# Patient Record
Sex: FEMALE | Race: WHITE | NOT HISPANIC OR LATINO | Employment: FULL TIME | URBAN - NONMETROPOLITAN AREA
[De-identification: names, ages, dates, MRNs, and addresses within clinical notes are randomized per-mention and may not be internally consistent; named-entity substitution may affect disease eponyms.]

---

## 2016-09-19 LAB
EXTERNAL ABO GROUPING: NORMAL
EXTERNAL ANTIBODY SCREEN: NEGATIVE
EXTERNAL CHLAMYDIA SCREEN: NORMAL
EXTERNAL GONORRHEA SCREEN: NORMAL
EXTERNAL GROUP B STREP ANTIGEN: NEGATIVE
EXTERNAL HERPES CULTURE: NORMAL
EXTERNAL RH FACTOR: POSITIVE
EXTERNAL RUBELLA QUALITATIVE: NORMAL
EXTERNAL SYPHILIS RPR SCREEN: NORMAL
HIV1 AB SPEC QL IA.RAPID: NEGATIVE

## 2017-05-16 ENCOUNTER — ANESTHESIA (OUTPATIENT)
Dept: LABOR AND DELIVERY | Facility: HOSPITAL | Age: 32
End: 2017-05-16

## 2017-05-16 ENCOUNTER — HOSPITAL ENCOUNTER (INPATIENT)
Facility: HOSPITAL | Age: 32
LOS: 2 days | Discharge: HOME | End: 2017-05-18
Attending: OBSTETRICS & GYNECOLOGY | Admitting: OBSTETRICS & GYNECOLOGY

## 2017-05-16 ENCOUNTER — ANESTHESIA EVENT (OUTPATIENT)
Dept: LABOR AND DELIVERY | Facility: HOSPITAL | Age: 32
End: 2017-05-16

## 2017-05-16 DIAGNOSIS — Z37.9 NORMAL LABOR: ICD-10-CM

## 2017-05-16 LAB
A1 MICROGLOB PLACENTAL VAG QL: POSITIVE
ABO GROUP BLD: NORMAL
BLD GP AB SCN SERPL QL: NEGATIVE
DEPRECATED RDW RBC AUTO: 45.4 FL (ref 40–54)
ERYTHROCYTE [DISTWIDTH] IN BLOOD BY AUTOMATED COUNT: 14.4 % (ref 12–15)
HCT VFR BLD AUTO: 35.1 % (ref 37–47)
HGB BLD-MCNC: 12 G/DL (ref 12–16)
LYMPHOCYTES # BLD MANUAL: 1.15 10*3/MM3 (ref 0.72–4.86)
LYMPHOCYTES NFR BLD MANUAL: 12 % (ref 15–45)
LYMPHOCYTES NFR BLD MANUAL: 5 % (ref 4–12)
MCH RBC QN AUTO: 29.8 PG (ref 28–32)
MCHC RBC AUTO-ENTMCNC: 34.2 G/DL (ref 33–36)
MCV RBC AUTO: 87.1 FL (ref 82–98)
MONOCYTES # BLD AUTO: 0.48 10*3/MM3 (ref 0.19–1.3)
NEUTROPHILS # BLD AUTO: 7.93 10*3/MM3 (ref 1.87–8.4)
NEUTROPHILS NFR BLD MANUAL: 83 % (ref 39–78)
PLAT MORPH BLD: NORMAL
PLATELET # BLD AUTO: 187 10*3/MM3 (ref 130–400)
PMV BLD AUTO: 10.9 FL (ref 6–12)
RBC # BLD AUTO: 4.03 10*6/MM3 (ref 4.2–5.4)
RBC MORPH BLD: NORMAL
RH BLD: POSITIVE
WBC MORPH BLD: NORMAL
WBC NRBC COR # BLD: 9.56 10*3/MM3 (ref 4.8–10.8)

## 2017-05-16 PROCEDURE — 84112 EVAL AMNIOTIC FLUID PROTEIN: CPT | Performed by: OBSTETRICS & GYNECOLOGY

## 2017-05-16 PROCEDURE — C1755 CATHETER, INTRASPINAL: HCPCS | Performed by: NURSE ANESTHETIST, CERTIFIED REGISTERED

## 2017-05-16 PROCEDURE — 86850 RBC ANTIBODY SCREEN: CPT | Performed by: OBSTETRICS & GYNECOLOGY

## 2017-05-16 PROCEDURE — 85027 COMPLETE CBC AUTOMATED: CPT | Performed by: OBSTETRICS & GYNECOLOGY

## 2017-05-16 PROCEDURE — 51703 INSERT BLADDER CATH COMPLEX: CPT

## 2017-05-16 PROCEDURE — 86900 BLOOD TYPING SEROLOGIC ABO: CPT | Performed by: OBSTETRICS & GYNECOLOGY

## 2017-05-16 PROCEDURE — 88307 TISSUE EXAM BY PATHOLOGIST: CPT | Performed by: OBSTETRICS & GYNECOLOGY

## 2017-05-16 PROCEDURE — 0KQM0ZZ REPAIR PERINEUM MUSCLE, OPEN APPROACH: ICD-10-PCS | Performed by: OBSTETRICS & GYNECOLOGY

## 2017-05-16 PROCEDURE — 25010000002 ROPIVACAINE PER 1 MG: Performed by: NURSE ANESTHETIST, CERTIFIED REGISTERED

## 2017-05-16 PROCEDURE — 85007 BL SMEAR W/DIFF WBC COUNT: CPT | Performed by: OBSTETRICS & GYNECOLOGY

## 2017-05-16 PROCEDURE — 86901 BLOOD TYPING SEROLOGIC RH(D): CPT | Performed by: OBSTETRICS & GYNECOLOGY

## 2017-05-16 RX ORDER — PROMETHAZINE HYDROCHLORIDE 25 MG/1
12.5 TABLET ORAL EVERY 6 HOURS PRN
Status: DISCONTINUED | OUTPATIENT
Start: 2017-05-16 | End: 2017-05-16

## 2017-05-16 RX ORDER — MORPHINE SULFATE 2 MG/ML
2 INJECTION, SOLUTION INTRAMUSCULAR; INTRAVENOUS
Status: DISCONTINUED | OUTPATIENT
Start: 2017-05-16 | End: 2017-05-16

## 2017-05-16 RX ORDER — ACETAMINOPHEN 325 MG/1
650 TABLET ORAL EVERY 4 HOURS PRN
Status: DISCONTINUED | OUTPATIENT
Start: 2017-05-16 | End: 2017-05-16

## 2017-05-16 RX ORDER — ONDANSETRON 4 MG/1
4 TABLET, FILM COATED ORAL EVERY 6 HOURS PRN
Status: DISCONTINUED | OUTPATIENT
Start: 2017-05-16 | End: 2017-05-16 | Stop reason: HOSPADM

## 2017-05-16 RX ORDER — PROMETHAZINE HYDROCHLORIDE 25 MG/ML
12.5 INJECTION, SOLUTION INTRAMUSCULAR; INTRAVENOUS EVERY 6 HOURS PRN
Status: DISCONTINUED | OUTPATIENT
Start: 2017-05-16 | End: 2017-05-16 | Stop reason: CLARIF

## 2017-05-16 RX ORDER — ONDANSETRON 4 MG/1
4 TABLET, FILM COATED ORAL EVERY 6 HOURS PRN
Status: DISCONTINUED | OUTPATIENT
Start: 2017-05-16 | End: 2017-05-16 | Stop reason: SDUPTHER

## 2017-05-16 RX ORDER — METHYLERGONOVINE MALEATE 0.2 MG/ML
200 INJECTION INTRAVENOUS AS NEEDED
Status: DISCONTINUED | OUTPATIENT
Start: 2017-05-16 | End: 2017-05-16 | Stop reason: HOSPADM

## 2017-05-16 RX ORDER — DOCUSATE SODIUM 100 MG/1
100 CAPSULE, LIQUID FILLED ORAL 2 TIMES DAILY
Status: DISCONTINUED | OUTPATIENT
Start: 2017-05-16 | End: 2017-05-18 | Stop reason: HOSPADM

## 2017-05-16 RX ORDER — CARBOPROST TROMETHAMINE 250 UG/ML
250 INJECTION, SOLUTION INTRAMUSCULAR ONCE AS NEEDED
Status: DISCONTINUED | OUTPATIENT
Start: 2017-05-16 | End: 2017-05-18 | Stop reason: HOSPADM

## 2017-05-16 RX ORDER — BISACODYL 10 MG
10 SUPPOSITORY, RECTAL RECTAL DAILY PRN
Status: DISCONTINUED | OUTPATIENT
Start: 2017-05-17 | End: 2017-05-18 | Stop reason: HOSPADM

## 2017-05-16 RX ORDER — TERBUTALINE SULFATE 1 MG/ML
0.25 INJECTION, SOLUTION SUBCUTANEOUS AS NEEDED
Status: DISCONTINUED | OUTPATIENT
Start: 2017-05-16 | End: 2017-05-16

## 2017-05-16 RX ORDER — OXYTOCIN/RINGER'S LACTATE 20/1000 ML
999 PLASTIC BAG, INJECTION (ML) INTRAVENOUS CONTINUOUS PRN
Status: DISCONTINUED | OUTPATIENT
Start: 2017-05-16 | End: 2017-05-16 | Stop reason: SDUPTHER

## 2017-05-16 RX ORDER — ONDANSETRON 4 MG/1
4 TABLET, ORALLY DISINTEGRATING ORAL EVERY 6 HOURS PRN
Status: DISCONTINUED | OUTPATIENT
Start: 2017-05-16 | End: 2017-05-16 | Stop reason: SDUPTHER

## 2017-05-16 RX ORDER — CETIRIZINE HYDROCHLORIDE 10 MG/1
10 TABLET ORAL DAILY
Status: ON HOLD | COMMUNITY
End: 2019-10-12

## 2017-05-16 RX ORDER — LIDOCAINE HYDROCHLORIDE 10 MG/ML
5 INJECTION, SOLUTION INFILTRATION; PERINEURAL AS NEEDED
Status: DISCONTINUED | OUTPATIENT
Start: 2017-05-16 | End: 2017-05-16

## 2017-05-16 RX ORDER — PROMETHAZINE HYDROCHLORIDE 12.5 MG/1
12.5 SUPPOSITORY RECTAL EVERY 6 HOURS PRN
Status: DISCONTINUED | OUTPATIENT
Start: 2017-05-16 | End: 2017-05-18 | Stop reason: HOSPADM

## 2017-05-16 RX ORDER — SODIUM CHLORIDE 0.9 % (FLUSH) 0.9 %
1-10 SYRINGE (ML) INJECTION AS NEEDED
Status: DISCONTINUED | OUTPATIENT
Start: 2017-05-16 | End: 2017-05-16

## 2017-05-16 RX ORDER — PROMETHAZINE HYDROCHLORIDE 25 MG/1
12.5 TABLET ORAL EVERY 6 HOURS PRN
Status: DISCONTINUED | OUTPATIENT
Start: 2017-05-16 | End: 2017-05-16 | Stop reason: SDUPTHER

## 2017-05-16 RX ORDER — PROMETHAZINE HYDROCHLORIDE 25 MG/ML
12.5 INJECTION, SOLUTION INTRAMUSCULAR; INTRAVENOUS EVERY 6 HOURS PRN
Status: DISCONTINUED | OUTPATIENT
Start: 2017-05-16 | End: 2017-05-16 | Stop reason: ALTCHOICE

## 2017-05-16 RX ORDER — MISOPROSTOL 200 UG/1
800 TABLET ORAL AS NEEDED
Status: DISCONTINUED | OUTPATIENT
Start: 2017-05-16 | End: 2017-05-16 | Stop reason: HOSPADM

## 2017-05-16 RX ORDER — MISOPROSTOL 200 UG/1
600 TABLET ORAL ONCE AS NEEDED
Status: DISCONTINUED | OUTPATIENT
Start: 2017-05-16 | End: 2017-05-18 | Stop reason: HOSPADM

## 2017-05-16 RX ORDER — OXYCODONE HYDROCHLORIDE AND ACETAMINOPHEN 5; 325 MG/1; MG/1
1 TABLET ORAL EVERY 6 HOURS PRN
Status: DISCONTINUED | OUTPATIENT
Start: 2017-05-16 | End: 2017-05-18 | Stop reason: HOSPADM

## 2017-05-16 RX ORDER — LIDOCAINE HYDROCHLORIDE AND EPINEPHRINE 15; 5 MG/ML; UG/ML
INJECTION, SOLUTION EPIDURAL AS NEEDED
Status: DISCONTINUED | OUTPATIENT
Start: 2017-05-16 | End: 2017-05-18 | Stop reason: SURG

## 2017-05-16 RX ORDER — ONDANSETRON 2 MG/ML
4 INJECTION INTRAMUSCULAR; INTRAVENOUS EVERY 6 HOURS PRN
Status: DISCONTINUED | OUTPATIENT
Start: 2017-05-16 | End: 2017-05-16 | Stop reason: SDUPTHER

## 2017-05-16 RX ORDER — IBUPROFEN 800 MG/1
800 TABLET ORAL EVERY 8 HOURS PRN
Status: DISCONTINUED | OUTPATIENT
Start: 2017-05-16 | End: 2017-05-18 | Stop reason: HOSPADM

## 2017-05-16 RX ORDER — PROMETHAZINE HYDROCHLORIDE 25 MG/1
25 TABLET ORAL EVERY 6 HOURS PRN
Status: DISCONTINUED | OUTPATIENT
Start: 2017-05-16 | End: 2017-05-18 | Stop reason: HOSPADM

## 2017-05-16 RX ORDER — CARBOPROST TROMETHAMINE 250 UG/ML
250 INJECTION, SOLUTION INTRAMUSCULAR AS NEEDED
Status: DISCONTINUED | OUTPATIENT
Start: 2017-05-16 | End: 2017-05-16 | Stop reason: HOSPADM

## 2017-05-16 RX ORDER — OXYTOCIN/RINGER'S LACTATE 20/1000 ML
999 PLASTIC BAG, INJECTION (ML) INTRAVENOUS ONCE
Status: DISCONTINUED | OUTPATIENT
Start: 2017-05-16 | End: 2017-05-16 | Stop reason: HOSPADM

## 2017-05-16 RX ORDER — ONDANSETRON 4 MG/1
4 TABLET, ORALLY DISINTEGRATING ORAL EVERY 6 HOURS PRN
Status: DISCONTINUED | OUTPATIENT
Start: 2017-05-16 | End: 2017-05-18 | Stop reason: HOSPADM

## 2017-05-16 RX ORDER — SODIUM CHLORIDE, SODIUM LACTATE, POTASSIUM CHLORIDE, CALCIUM CHLORIDE 600; 310; 30; 20 MG/100ML; MG/100ML; MG/100ML; MG/100ML
125 INJECTION, SOLUTION INTRAVENOUS CONTINUOUS
Status: DISCONTINUED | OUTPATIENT
Start: 2017-05-16 | End: 2017-05-16

## 2017-05-16 RX ORDER — OXYTOCIN/0.9 % SODIUM CHLORIDE 30/500 ML
2-30 PLASTIC BAG, INJECTION (ML) INTRAVENOUS
Status: DISCONTINUED | OUTPATIENT
Start: 2017-05-16 | End: 2017-05-16

## 2017-05-16 RX ORDER — METHYLERGONOVINE MALEATE 0.2 MG/ML
200 INJECTION INTRAVENOUS ONCE AS NEEDED
Status: DISCONTINUED | OUTPATIENT
Start: 2017-05-16 | End: 2017-05-18 | Stop reason: HOSPADM

## 2017-05-16 RX ORDER — PROMETHAZINE HYDROCHLORIDE 12.5 MG/1
12.5 SUPPOSITORY RECTAL EVERY 6 HOURS PRN
Status: DISCONTINUED | OUTPATIENT
Start: 2017-05-16 | End: 2017-05-16 | Stop reason: HOSPADM

## 2017-05-16 RX ORDER — ONDANSETRON 2 MG/ML
4 INJECTION INTRAMUSCULAR; INTRAVENOUS EVERY 6 HOURS PRN
Status: DISCONTINUED | OUTPATIENT
Start: 2017-05-16 | End: 2017-05-18 | Stop reason: HOSPADM

## 2017-05-16 RX ORDER — PROMETHAZINE HYDROCHLORIDE 12.5 MG/1
12.5 SUPPOSITORY RECTAL EVERY 6 HOURS PRN
Status: DISCONTINUED | OUTPATIENT
Start: 2017-05-16 | End: 2017-05-16 | Stop reason: SDUPTHER

## 2017-05-16 RX ORDER — ONDANSETRON 4 MG/1
4 TABLET, ORALLY DISINTEGRATING ORAL EVERY 6 HOURS PRN
Status: DISCONTINUED | OUTPATIENT
Start: 2017-05-16 | End: 2017-05-16 | Stop reason: HOSPADM

## 2017-05-16 RX ORDER — ONDANSETRON 2 MG/ML
4 INJECTION INTRAMUSCULAR; INTRAVENOUS EVERY 6 HOURS PRN
Status: DISCONTINUED | OUTPATIENT
Start: 2017-05-16 | End: 2017-05-16 | Stop reason: HOSPADM

## 2017-05-16 RX ORDER — IBUPROFEN 800 MG/1
800 TABLET ORAL EVERY 8 HOURS PRN
Status: DISCONTINUED | OUTPATIENT
Start: 2017-05-16 | End: 2017-05-16 | Stop reason: SDUPTHER

## 2017-05-16 RX ORDER — PRENATAL VIT/IRON FUM/FOLIC AC 27MG-0.8MG
1 TABLET ORAL DAILY
Status: DISCONTINUED | OUTPATIENT
Start: 2017-05-16 | End: 2017-05-18 | Stop reason: HOSPADM

## 2017-05-16 RX ORDER — ONDANSETRON 4 MG/1
4 TABLET, FILM COATED ORAL EVERY 6 HOURS PRN
Status: DISCONTINUED | OUTPATIENT
Start: 2017-05-16 | End: 2017-05-18 | Stop reason: HOSPADM

## 2017-05-16 RX ORDER — LIDOCAINE HYDROCHLORIDE 20 MG/ML
INJECTION, SOLUTION EPIDURAL; INFILTRATION; INTRACAUDAL; PERINEURAL AS NEEDED
Status: DISCONTINUED | OUTPATIENT
Start: 2017-05-16 | End: 2017-05-18 | Stop reason: SURG

## 2017-05-16 RX ORDER — LIDOCAINE HYDROCHLORIDE 10 MG/ML
INJECTION, SOLUTION EPIDURAL; INFILTRATION; INTRACAUDAL; PERINEURAL AS NEEDED
Status: DISCONTINUED | OUTPATIENT
Start: 2017-05-16 | End: 2017-05-18 | Stop reason: SURG

## 2017-05-16 RX ORDER — OXYTOCIN/RINGER'S LACTATE 20/1000 ML
125 PLASTIC BAG, INJECTION (ML) INTRAVENOUS AS NEEDED
Status: DISCONTINUED | OUTPATIENT
Start: 2017-05-16 | End: 2017-05-16 | Stop reason: HOSPADM

## 2017-05-16 RX ORDER — PRENATAL VIT NO.126/IRON/FOLIC 28MG-0.8MG
1 TABLET ORAL DAILY
Status: ON HOLD | COMMUNITY
End: 2019-10-12

## 2017-05-16 RX ORDER — SODIUM CHLORIDE 0.9 % (FLUSH) 0.9 %
1-10 SYRINGE (ML) INJECTION AS NEEDED
Status: DISCONTINUED | OUTPATIENT
Start: 2017-05-16 | End: 2017-05-18 | Stop reason: HOSPADM

## 2017-05-16 RX ORDER — BUTORPHANOL TARTRATE 1 MG/ML
1 INJECTION, SOLUTION INTRAMUSCULAR; INTRAVENOUS
Status: DISCONTINUED | OUTPATIENT
Start: 2017-05-16 | End: 2017-05-16

## 2017-05-16 RX ADMIN — DOCUSATE SODIUM 100 MG: 100 CAPSULE, LIQUID FILLED ORAL at 21:00

## 2017-05-16 RX ADMIN — SODIUM CHLORIDE, POTASSIUM CHLORIDE, SODIUM LACTATE AND CALCIUM CHLORIDE 1000 ML: 600; 310; 30; 20 INJECTION, SOLUTION INTRAVENOUS at 13:30

## 2017-05-16 RX ADMIN — LIDOCAINE HYDROCHLORIDE 5 ML: 20 INJECTION, SOLUTION EPIDURAL; INFILTRATION; INTRACAUDAL; PERINEURAL at 14:07

## 2017-05-16 RX ADMIN — SODIUM CHLORIDE, POTASSIUM CHLORIDE, SODIUM LACTATE AND CALCIUM CHLORIDE 125 ML/HR: 600; 310; 30; 20 INJECTION, SOLUTION INTRAVENOUS at 10:09

## 2017-05-16 RX ADMIN — LIDOCAINE HYDROCHLORIDE 5 ML: 20 INJECTION, SOLUTION EPIDURAL; INFILTRATION; INTRACAUDAL; PERINEURAL at 14:03

## 2017-05-16 RX ADMIN — LIDOCAINE HYDROCHLORIDE 3 ML: 10 INJECTION, SOLUTION EPIDURAL; INFILTRATION; INTRACAUDAL; PERINEURAL at 13:39

## 2017-05-16 RX ADMIN — EPHEDRINE SULFATE 10 MG: 50 INJECTION INTRAMUSCULAR; INTRAVENOUS; SUBCUTANEOUS at 14:36

## 2017-05-16 RX ADMIN — LIDOCAINE HYDROCHLORIDE 3 ML: 10 INJECTION, SOLUTION EPIDURAL; INFILTRATION; INTRACAUDAL; PERINEURAL at 13:50

## 2017-05-16 RX ADMIN — OXYTOCIN-SODIUM CHLORIDE 0.9% IV SOLN 30 UNIT/500ML 2 MILLI-UNITS/MIN: 30-0.9/5 SOLUTION at 10:28

## 2017-05-16 RX ADMIN — ROPIVACAINE HYDROCHLORIDE 10 ML/HR: 2 INJECTION, SOLUTION EPIDURAL; INFILTRATION at 14:09

## 2017-05-16 RX ADMIN — LIDOCAINE HYDROCHLORIDE,EPINEPHRINE BITARTRATE 3 ML: 15; .005 INJECTION, SOLUTION EPIDURAL; INFILTRATION; INTRACAUDAL; PERINEURAL at 13:42

## 2017-05-16 RX ADMIN — LIDOCAINE HYDROCHLORIDE,EPINEPHRINE BITARTRATE 3 ML: 15; .005 INJECTION, SOLUTION EPIDURAL; INFILTRATION; INTRACAUDAL; PERINEURAL at 13:58

## 2017-05-16 RX ADMIN — PRENATAL VIT W/ FE FUMARATE-FA TAB 27-0.8 MG 1 TABLET: 27-0.8 TAB at 21:00

## 2017-05-17 LAB
HCT VFR BLD AUTO: 32.9 % (ref 37–47)
HGB BLD-MCNC: 11 G/DL (ref 12–16)

## 2017-05-17 PROCEDURE — 85014 HEMATOCRIT: CPT | Performed by: OBSTETRICS & GYNECOLOGY

## 2017-05-17 PROCEDURE — 85018 HEMOGLOBIN: CPT | Performed by: OBSTETRICS & GYNECOLOGY

## 2017-05-17 RX ADMIN — DOCUSATE SODIUM 100 MG: 100 CAPSULE, LIQUID FILLED ORAL at 09:14

## 2017-05-17 RX ADMIN — DOCUSATE SODIUM 100 MG: 100 CAPSULE, LIQUID FILLED ORAL at 19:11

## 2017-05-17 RX ADMIN — IBUPROFEN 800 MG: 800 TABLET, FILM COATED ORAL at 19:48

## 2017-05-17 RX ADMIN — IBUPROFEN 800 MG: 800 TABLET, FILM COATED ORAL at 03:43

## 2017-05-17 RX ADMIN — PRENATAL VIT W/ FE FUMARATE-FA TAB 27-0.8 MG 1 TABLET: 27-0.8 TAB at 19:11

## 2017-05-18 VITALS
SYSTOLIC BLOOD PRESSURE: 123 MMHG | DIASTOLIC BLOOD PRESSURE: 72 MMHG | RESPIRATION RATE: 18 BRPM | HEART RATE: 87 BPM | BODY MASS INDEX: 42.52 KG/M2 | HEIGHT: 63 IN | WEIGHT: 240 LBS | TEMPERATURE: 97.8 F | OXYGEN SATURATION: 100 %

## 2017-05-18 RX ORDER — OXYCODONE HYDROCHLORIDE AND ACETAMINOPHEN 5; 325 MG/1; MG/1
1 TABLET ORAL EVERY 6 HOURS PRN
Qty: 10 TABLET | Refills: 0 | Status: SHIPPED | OUTPATIENT
Start: 2017-05-18 | End: 2017-05-26

## 2017-05-18 RX ORDER — IBUPROFEN 800 MG/1
800 TABLET ORAL EVERY 8 HOURS PRN
Qty: 90 TABLET | Refills: 2 | Status: SHIPPED | OUTPATIENT
Start: 2017-05-18

## 2017-05-18 RX ADMIN — DOCUSATE SODIUM 100 MG: 100 CAPSULE, LIQUID FILLED ORAL at 08:55

## 2017-05-19 ENCOUNTER — TELEPHONE (OUTPATIENT)
Dept: LACTATION | Facility: HOSPITAL | Age: 32
End: 2017-05-19

## 2017-05-19 LAB
CYTO UR: NORMAL
LAB AP CASE REPORT: NORMAL
LAB AP CLINICAL INFORMATION: NORMAL
Lab: NORMAL
PATH REPORT.FINAL DX SPEC: NORMAL
PATH REPORT.GROSS SPEC: NORMAL

## 2017-05-20 ENCOUNTER — HOSPITAL ENCOUNTER (OUTPATIENT)
Dept: LACTATION | Facility: HOSPITAL | Age: 32
Discharge: HOME | End: 2017-05-20

## 2017-05-22 ENCOUNTER — HOSPITAL ENCOUNTER (OUTPATIENT)
Dept: LACTATION | Facility: HOSPITAL | Age: 32
Discharge: HOME | End: 2017-05-22

## 2019-10-11 ENCOUNTER — APPOINTMENT (OUTPATIENT)
Dept: CT IMAGING | Facility: HOSPITAL | Age: 34
End: 2019-10-11

## 2019-10-11 ENCOUNTER — APPOINTMENT (OUTPATIENT)
Dept: GENERAL RADIOLOGY | Facility: HOSPITAL | Age: 34
End: 2019-10-11

## 2019-10-11 ENCOUNTER — HOSPITAL ENCOUNTER (OUTPATIENT)
Facility: HOSPITAL | Age: 34
Setting detail: OBSERVATION
Discharge: HOME | End: 2019-10-12
Attending: EMERGENCY MEDICINE | Admitting: INTERNAL MEDICINE
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
## 2019-10-11 DIAGNOSIS — J18.9 HOSPITAL-ACQUIRED PNEUMONIA: Primary | ICD-10-CM

## 2019-10-11 LAB
ALBUMIN SERPL-MCNC: 3.9 G/DL (ref 3.5–5.2)
ALBUMIN/GLOB SERPL: 0.9 G/DL
ALP SERPL-CCNC: 101 U/L (ref 39–117)
ALT SERPL W P-5'-P-CCNC: 14 U/L (ref 1–33)
ANION GAP SERPL CALCULATED.3IONS-SCNC: 14 MMOL/L (ref 5–15)
APTT PPP: 30.1 SECONDS (ref 24.1–35)
AST SERPL-CCNC: 18 U/L (ref 1–32)
ATMOSPHERIC PRESS: 754 MMHG
BASE EXCESS BLDV CALC-SCNC: 0.7 MMOL/L (ref 0–2)
BASOPHILS # BLD AUTO: 0.02 10*3/MM3 (ref 0–0.2)
BASOPHILS NFR BLD AUTO: 0.2 % (ref 0–1.5)
BDY SITE: ABNORMAL
BILIRUB SERPL-MCNC: 0.3 MG/DL (ref 0.2–1.2)
BODY TEMPERATURE: 37 C
BUN BLD-MCNC: 8 MG/DL (ref 6–20)
BUN/CREAT SERPL: 10.8 (ref 7–25)
CALCIUM SPEC-SCNC: 8.8 MG/DL (ref 8.6–10.5)
CHLORIDE SERPL-SCNC: 98 MMOL/L (ref 98–107)
CO2 SERPL-SCNC: 23 MMOL/L (ref 22–29)
CREAT BLD-MCNC: 0.74 MG/DL (ref 0.57–1)
D DIMER PPP FEU-MCNC: 2.08 MG/L (FEU) (ref 0–0.5)
DEPRECATED RDW RBC AUTO: 40.1 FL (ref 37–54)
EOSINOPHIL # BLD AUTO: 0.11 10*3/MM3 (ref 0–0.4)
EOSINOPHIL NFR BLD AUTO: 1.1 % (ref 0.3–6.2)
ERYTHROCYTE [DISTWIDTH] IN BLOOD BY AUTOMATED COUNT: 12.7 % (ref 12.3–15.4)
GFR SERPL CREATININE-BSD FRML MDRD: 90 ML/MIN/1.73
GLOBULIN UR ELPH-MCNC: 4.4 GM/DL
GLUCOSE BLD-MCNC: 98 MG/DL (ref 65–99)
HCG SERPL QL: NEGATIVE
HCO3 BLDV-SCNC: 25.7 MMOL/L (ref 22–28)
HCT VFR BLD AUTO: 41.8 % (ref 34–46.6)
HGB BLD-MCNC: 13.9 G/DL (ref 12–15.9)
HOLD SPECIMEN: NORMAL
IMM GRANULOCYTES # BLD AUTO: 0.03 10*3/MM3 (ref 0–0.05)
IMM GRANULOCYTES NFR BLD AUTO: 0.3 % (ref 0–0.5)
INR PPP: 0.95 (ref 0.91–1.09)
LYMPHOCYTES # BLD AUTO: 0.26 10*3/MM3 (ref 0.7–3.1)
LYMPHOCYTES NFR BLD AUTO: 2.6 % (ref 19.6–45.3)
Lab: ABNORMAL
MCH RBC QN AUTO: 29 PG (ref 26.6–33)
MCHC RBC AUTO-ENTMCNC: 33.3 G/DL (ref 31.5–35.7)
MCV RBC AUTO: 87.1 FL (ref 79–97)
MODALITY: ABNORMAL
MONOCYTES # BLD AUTO: 0.28 10*3/MM3 (ref 0.1–0.9)
MONOCYTES NFR BLD AUTO: 2.8 % (ref 5–12)
NEUTROPHILS # BLD AUTO: 9.46 10*3/MM3 (ref 1.7–7)
NEUTROPHILS NFR BLD AUTO: 93 % (ref 42.7–76)
NRBC BLD AUTO-RTO: 0 /100 WBC (ref 0–0.2)
PCO2 BLDV: 41.7 MM HG (ref 41–51)
PH BLDV: 7.4 PH UNITS (ref 7.32–7.42)
PLATELET # BLD AUTO: 251 10*3/MM3 (ref 140–450)
PMV BLD AUTO: 9.4 FL (ref 6–12)
PO2 BLDV: 16.2 MM HG (ref 27–53)
POTASSIUM BLD-SCNC: 4.1 MMOL/L (ref 3.5–5.2)
PROT SERPL-MCNC: 8.3 G/DL (ref 6–8.5)
PROTHROMBIN TIME: 13 SECONDS (ref 11.9–14.6)
RBC # BLD AUTO: 4.8 10*6/MM3 (ref 3.77–5.28)
SAO2 % BLDCOV: 23.2 % (ref 45–75)
SODIUM BLD-SCNC: 135 MMOL/L (ref 136–145)
TROPONIN T SERPL-MCNC: <0.01 NG/ML (ref 0–0.03)
VENTILATOR MODE: ABNORMAL
WBC NRBC COR # BLD: 10.16 10*3/MM3 (ref 3.4–10.8)
WHOLE BLOOD HOLD SPECIMEN: NORMAL
WHOLE BLOOD HOLD SPECIMEN: NORMAL

## 2019-10-11 PROCEDURE — 25010000002 VANCOMYCIN 1 G RECONSTITUTED SOLUTION 1 EACH VIAL: Performed by: EMERGENCY MEDICINE

## 2019-10-11 PROCEDURE — G0378 HOSPITAL OBSERVATION PER HR: HCPCS

## 2019-10-11 PROCEDURE — 87040 BLOOD CULTURE FOR BACTERIA: CPT | Performed by: EMERGENCY MEDICINE

## 2019-10-11 PROCEDURE — 82803 BLOOD GASES ANY COMBINATION: CPT

## 2019-10-11 PROCEDURE — 84484 ASSAY OF TROPONIN QUANT: CPT | Performed by: EMERGENCY MEDICINE

## 2019-10-11 PROCEDURE — 85730 THROMBOPLASTIN TIME PARTIAL: CPT | Performed by: EMERGENCY MEDICINE

## 2019-10-11 PROCEDURE — 71046 X-RAY EXAM CHEST 2 VIEWS: CPT

## 2019-10-11 PROCEDURE — 96368 THER/DIAG CONCURRENT INF: CPT

## 2019-10-11 PROCEDURE — 96361 HYDRATE IV INFUSION ADD-ON: CPT

## 2019-10-11 PROCEDURE — 71275 CT ANGIOGRAPHY CHEST: CPT

## 2019-10-11 PROCEDURE — 96365 THER/PROPH/DIAG IV INF INIT: CPT

## 2019-10-11 PROCEDURE — 84703 CHORIONIC GONADOTROPIN ASSAY: CPT | Performed by: EMERGENCY MEDICINE

## 2019-10-11 PROCEDURE — 0 IOPAMIDOL PER 1 ML: Performed by: EMERGENCY MEDICINE

## 2019-10-11 PROCEDURE — 85025 COMPLETE CBC W/AUTO DIFF WBC: CPT | Performed by: EMERGENCY MEDICINE

## 2019-10-11 PROCEDURE — 93010 ELECTROCARDIOGRAM REPORT: CPT | Performed by: INTERNAL MEDICINE

## 2019-10-11 PROCEDURE — 99284 EMERGENCY DEPT VISIT MOD MDM: CPT

## 2019-10-11 PROCEDURE — 85610 PROTHROMBIN TIME: CPT | Performed by: EMERGENCY MEDICINE

## 2019-10-11 PROCEDURE — 80053 COMPREHEN METABOLIC PANEL: CPT | Performed by: EMERGENCY MEDICINE

## 2019-10-11 PROCEDURE — 93005 ELECTROCARDIOGRAM TRACING: CPT | Performed by: EMERGENCY MEDICINE

## 2019-10-11 PROCEDURE — 85379 FIBRIN DEGRADATION QUANT: CPT | Performed by: EMERGENCY MEDICINE

## 2019-10-11 RX ORDER — GUAIFENESIN AND PSEUDOEPHEDRINE HCL 1200; 120 MG/1; MG/1
TABLET, EXTENDED RELEASE ORAL DAILY
COMMUNITY

## 2019-10-11 RX ORDER — SODIUM CHLORIDE 9 MG/ML
100 INJECTION, SOLUTION INTRAVENOUS CONTINUOUS
Status: DISCONTINUED | OUTPATIENT
Start: 2019-10-12 | End: 2019-10-12

## 2019-10-11 RX ORDER — LORATADINE 10 MG/1
10 TABLET ORAL DAILY
COMMUNITY

## 2019-10-11 RX ORDER — IBUPROFEN 800 MG/1
800 TABLET ORAL EVERY 8 HOURS PRN
Status: DISCONTINUED | OUTPATIENT
Start: 2019-10-11 | End: 2019-10-12 | Stop reason: HOSPADM

## 2019-10-11 RX ORDER — SODIUM CHLORIDE 0.9 % (FLUSH) 0.9 %
10 SYRINGE (ML) INJECTION EVERY 12 HOURS SCHEDULED
Status: DISCONTINUED | OUTPATIENT
Start: 2019-10-12 | End: 2019-10-12 | Stop reason: HOSPADM

## 2019-10-11 RX ORDER — SODIUM CHLORIDE 0.9 % (FLUSH) 0.9 %
10 SYRINGE (ML) INJECTION AS NEEDED
Status: DISCONTINUED | OUTPATIENT
Start: 2019-10-11 | End: 2019-10-12 | Stop reason: HOSPADM

## 2019-10-11 RX ORDER — ACETAMINOPHEN 325 MG/1
650 TABLET ORAL EVERY 4 HOURS PRN
Status: DISCONTINUED | OUTPATIENT
Start: 2019-10-11 | End: 2019-10-12 | Stop reason: HOSPADM

## 2019-10-11 RX ORDER — ACETAMINOPHEN 160 MG/5ML
650 SOLUTION ORAL EVERY 4 HOURS PRN
Status: DISCONTINUED | OUTPATIENT
Start: 2019-10-11 | End: 2019-10-12 | Stop reason: HOSPADM

## 2019-10-11 RX ORDER — ONDANSETRON 2 MG/ML
4 INJECTION INTRAMUSCULAR; INTRAVENOUS EVERY 6 HOURS PRN
Status: DISCONTINUED | OUTPATIENT
Start: 2019-10-11 | End: 2019-10-12 | Stop reason: HOSPADM

## 2019-10-11 RX ORDER — ACETAMINOPHEN 650 MG/1
650 SUPPOSITORY RECTAL EVERY 4 HOURS PRN
Status: DISCONTINUED | OUTPATIENT
Start: 2019-10-11 | End: 2019-10-12 | Stop reason: HOSPADM

## 2019-10-11 RX ORDER — GUAIFENESIN, PSEUDOEPHEDRINE HYDROCHLORIDE 600; 60 MG/1; MG/1
2 TABLET, EXTENDED RELEASE ORAL DAILY
Status: DISCONTINUED | OUTPATIENT
Start: 2019-10-12 | End: 2019-10-12 | Stop reason: HOSPADM

## 2019-10-11 RX ORDER — IPRATROPIUM BROMIDE AND ALBUTEROL SULFATE 2.5; .5 MG/3ML; MG/3ML
3 SOLUTION RESPIRATORY (INHALATION)
Status: DISCONTINUED | OUTPATIENT
Start: 2019-10-12 | End: 2019-10-12 | Stop reason: HOSPADM

## 2019-10-11 RX ORDER — CETIRIZINE HYDROCHLORIDE 10 MG/1
10 TABLET ORAL DAILY
Status: DISCONTINUED | OUTPATIENT
Start: 2019-10-12 | End: 2019-10-12 | Stop reason: HOSPADM

## 2019-10-11 RX ADMIN — VANCOMYCIN HYDROCHLORIDE 2000 MG: 1 INJECTION, POWDER, LYOPHILIZED, FOR SOLUTION INTRAVENOUS at 21:16

## 2019-10-11 RX ADMIN — SODIUM CHLORIDE 1572 ML: 9 INJECTION, SOLUTION INTRAVENOUS at 18:39

## 2019-10-11 RX ADMIN — SODIUM CHLORIDE 1000 ML: 9 INJECTION, SOLUTION INTRAVENOUS at 21:26

## 2019-10-11 RX ADMIN — IBUPROFEN 800 MG: 800 TABLET ORAL at 23:29

## 2019-10-11 RX ADMIN — IOPAMIDOL 89 ML: 755 INJECTION, SOLUTION INTRAVENOUS at 19:36

## 2019-10-11 RX ADMIN — SODIUM CHLORIDE 2 G: 900 INJECTION INTRAVENOUS at 21:22

## 2019-10-11 NOTE — ED PROVIDER NOTES
Subjective   This 33-year-old female patient arrives today because she started having headaches this morning and then her cough has come back well along with pain to the right side of her chest which is similar to when she had pneumonia which was diagnosed on 29 September.  Also complains of bilateral heel pain.  She states that she was admitted to [facility] on 29 September overnight and they did do a CTA because her d-dimer was elevated to rule out PE which was negative.  She apparently was sent home on Levaquin and she finished the Levaquin by 2 days ago.  She denies any nausea or vomiting.  She states her headache was 7/10 at its worst and is now 3/10.  She denies any neck stiffness.  She does not really feel short of breath.  Denies diarrhea or urinary symptoms.  She has been having right calf pain for couple of days.      Past medical history is negative   Past surgical history is negative   habits patient denies smoking, patient he drinks alcohol but has not had any since her illness.  Denies use of drugs.            Review of Systems   Constitutional: Positive for chills, fatigue and fever.   HENT: Positive for ear pain. Negative for sore throat and trouble swallowing.    Respiratory: Positive for cough and shortness of breath.    Cardiovascular: Positive for chest pain and palpitations.   Gastrointestinal: Negative for abdominal pain, diarrhea, nausea and vomiting.   Genitourinary: Negative.    Musculoskeletal: Negative.    Neurological: Positive for headaches.   Psychiatric/Behavioral: Negative.        No past medical history on file.    Allergies   Allergen Reactions   • Ceclor [Cefaclor] Swelling   • Tetanus Toxoids Hives   • Bactrim [Sulfamethoxazole-Trimethoprim] Rash       Past Surgical History:   Procedure Laterality Date   • CHOLECYSTECTOMY     • ORTHOPEDIC SURGERY Left 04/1999       No family history on file.    Social History     Socioeconomic History   • Marital status:       Spouse name: Not on file   • Number of children: Not on file   • Years of education: Not on file   • Highest education level: Not on file   Tobacco Use   • Smoking status: Never Smoker   Substance and Sexual Activity   • Alcohol use: No   • Drug use: No   • Sexual activity: Yes           Objective   Physical Exam   Constitutional: She is oriented to person, place, and time. She appears well-developed and well-nourished.   HENT:   Head: Normocephalic and atraumatic.   Eyes: EOM are normal. Pupils are equal, round, and reactive to light.   Neck: Normal range of motion. Neck supple.   Cardiovascular: Regular rhythm and normal heart sounds.   Patient has a very rapid but regular rhythm.  No murmurs are heard.   Abdominal: Soft. Bowel sounds are normal. There is no tenderness.   Musculoskeletal: Normal range of motion.   Neurological: She is alert and oriented to person, place, and time.   Skin: Skin is warm and dry.   Nursing note and vitals reviewed.      Procedures           ED Course                  MDM  Number of Diagnoses or Management Options  Diagnosis management comments: 1900 the patient's chest x-ray was read as negative for pneumonia by the radiologist.  Ddimer is showing elevated so I am suspecting that she may well have a PE this time.  CTA is ordered.  2030 CT has now been read by the radiologist and it looks as if it showing an pneumonia still.  Evidence of a pulmonary embolism.  Cultures have been collected and the patient is being started on antibiotics for hospital-acquired pneumonia.  She continues to be tachycardic.  Will readmit to the hospital.  2140  Case discussed with Dr Pratt who feels she can be admitted to observation.  Will do.        Final diagnoses:   Hospital-acquired pneumonia              Wesley Preciado DO  10/11/19 2142

## 2019-10-12 VITALS
RESPIRATION RATE: 16 BRPM | HEIGHT: 63 IN | TEMPERATURE: 98.6 F | BODY MASS INDEX: 37.92 KG/M2 | HEART RATE: 95 BPM | DIASTOLIC BLOOD PRESSURE: 66 MMHG | OXYGEN SATURATION: 98 % | WEIGHT: 214 LBS | SYSTOLIC BLOOD PRESSURE: 106 MMHG

## 2019-10-12 PROBLEM — J10.1 INFLUENZA A: Status: ACTIVE | Noted: 2019-10-12

## 2019-10-12 PROBLEM — A41.9 SEPSIS DUE TO PNEUMONIA (HCC): Status: ACTIVE | Noted: 2019-10-12

## 2019-10-12 PROBLEM — R50.9 FEVER: Status: ACTIVE | Noted: 2019-10-12

## 2019-10-12 PROBLEM — R00.0 SINUS TACHYCARDIA: Status: ACTIVE | Noted: 2019-10-12

## 2019-10-12 PROBLEM — J18.9 SEPSIS DUE TO PNEUMONIA (HCC): Status: ACTIVE | Noted: 2019-10-12

## 2019-10-12 LAB
ALBUMIN SERPL-MCNC: 3.1 G/DL (ref 3.5–5.2)
ALBUMIN/GLOB SERPL: 0.9 G/DL
ALP SERPL-CCNC: 75 U/L (ref 39–117)
ALT SERPL W P-5'-P-CCNC: 9 U/L (ref 1–33)
ANION GAP SERPL CALCULATED.3IONS-SCNC: 11 MMOL/L (ref 5–15)
AST SERPL-CCNC: 15 U/L (ref 1–32)
BASOPHILS # BLD AUTO: 0.01 10*3/MM3 (ref 0–0.2)
BASOPHILS NFR BLD AUTO: 0.2 % (ref 0–1.5)
BILIRUB SERPL-MCNC: 0.3 MG/DL (ref 0.2–1.2)
BUN BLD-MCNC: 6 MG/DL (ref 6–20)
BUN/CREAT SERPL: 10.2 (ref 7–25)
CALCIUM SPEC-SCNC: 7.8 MG/DL (ref 8.6–10.5)
CHLORIDE SERPL-SCNC: 107 MMOL/L (ref 98–107)
CO2 SERPL-SCNC: 21 MMOL/L (ref 22–29)
CREAT BLD-MCNC: 0.59 MG/DL (ref 0.57–1)
DEPRECATED RDW RBC AUTO: 40.8 FL (ref 37–54)
EOSINOPHIL # BLD AUTO: 0.14 10*3/MM3 (ref 0–0.4)
EOSINOPHIL NFR BLD AUTO: 2.4 % (ref 0.3–6.2)
ERYTHROCYTE [DISTWIDTH] IN BLOOD BY AUTOMATED COUNT: 12.8 % (ref 12.3–15.4)
FLUAV AG NPH QL: POSITIVE
FLUBV AG NPH QL IA: NEGATIVE
GFR SERPL CREATININE-BSD FRML MDRD: 117 ML/MIN/1.73
GLOBULIN UR ELPH-MCNC: 3.3 GM/DL
GLUCOSE BLD-MCNC: 97 MG/DL (ref 65–99)
HCT VFR BLD AUTO: 34.5 % (ref 34–46.6)
HGB BLD-MCNC: 11.6 G/DL (ref 12–15.9)
IMM GRANULOCYTES # BLD AUTO: 0.02 10*3/MM3 (ref 0–0.05)
IMM GRANULOCYTES NFR BLD AUTO: 0.3 % (ref 0–0.5)
L PNEUMO1 AG UR QL IA: NEGATIVE
LYMPHOCYTES # BLD AUTO: 0.72 10*3/MM3 (ref 0.7–3.1)
LYMPHOCYTES NFR BLD AUTO: 12.5 % (ref 19.6–45.3)
MCH RBC QN AUTO: 29.1 PG (ref 26.6–33)
MCHC RBC AUTO-ENTMCNC: 33.6 G/DL (ref 31.5–35.7)
MCV RBC AUTO: 86.7 FL (ref 79–97)
MONOCYTES # BLD AUTO: 0.41 10*3/MM3 (ref 0.1–0.9)
MONOCYTES NFR BLD AUTO: 7.1 % (ref 5–12)
MRSA DNA SPEC QL NAA+PROBE: NORMAL
NEUTROPHILS # BLD AUTO: 4.45 10*3/MM3 (ref 1.7–7)
NEUTROPHILS NFR BLD AUTO: 77.5 % (ref 42.7–76)
NRBC BLD AUTO-RTO: 0 /100 WBC (ref 0–0.2)
PLATELET # BLD AUTO: 199 10*3/MM3 (ref 140–450)
PMV BLD AUTO: 9.9 FL (ref 6–12)
POTASSIUM BLD-SCNC: 3.6 MMOL/L (ref 3.5–5.2)
PROT SERPL-MCNC: 6.4 G/DL (ref 6–8.5)
RBC # BLD AUTO: 3.98 10*6/MM3 (ref 3.77–5.28)
S PNEUM AG SPEC QL LA: NEGATIVE
SODIUM BLD-SCNC: 139 MMOL/L (ref 136–145)
WBC NRBC COR # BLD: 5.75 10*3/MM3 (ref 3.4–10.8)

## 2019-10-12 PROCEDURE — 96361 HYDRATE IV INFUSION ADD-ON: CPT

## 2019-10-12 PROCEDURE — 87641 MR-STAPH DNA AMP PROBE: CPT | Performed by: INTERNAL MEDICINE

## 2019-10-12 PROCEDURE — 87070 CULTURE OTHR SPECIMN AEROBIC: CPT | Performed by: INTERNAL MEDICINE

## 2019-10-12 PROCEDURE — 94640 AIRWAY INHALATION TREATMENT: CPT

## 2019-10-12 PROCEDURE — 25010000002 VANCOMYCIN 10 G RECONSTITUTED SOLUTION: Performed by: INTERNAL MEDICINE

## 2019-10-12 PROCEDURE — 87899 AGENT NOS ASSAY W/OPTIC: CPT | Performed by: NURSE PRACTITIONER

## 2019-10-12 PROCEDURE — 94799 UNLISTED PULMONARY SVC/PX: CPT

## 2019-10-12 PROCEDURE — 80053 COMPREHEN METABOLIC PANEL: CPT | Performed by: INTERNAL MEDICINE

## 2019-10-12 PROCEDURE — G0378 HOSPITAL OBSERVATION PER HR: HCPCS

## 2019-10-12 PROCEDURE — 85025 COMPLETE CBC W/AUTO DIFF WBC: CPT | Performed by: INTERNAL MEDICINE

## 2019-10-12 PROCEDURE — 87205 SMEAR GRAM STAIN: CPT | Performed by: INTERNAL MEDICINE

## 2019-10-12 PROCEDURE — 87804 INFLUENZA ASSAY W/OPTIC: CPT | Performed by: NURSE PRACTITIONER

## 2019-10-12 PROCEDURE — 94760 N-INVAS EAR/PLS OXIMETRY 1: CPT

## 2019-10-12 PROCEDURE — 96366 THER/PROPH/DIAG IV INF ADDON: CPT

## 2019-10-12 RX ORDER — AMOXICILLIN AND CLAVULANATE POTASSIUM 875; 125 MG/1; MG/1
1 TABLET, FILM COATED ORAL 2 TIMES DAILY
Qty: 14 TABLET | Refills: 0 | Status: SHIPPED | OUTPATIENT
Start: 2019-10-12 | End: 2019-10-19

## 2019-10-12 RX ORDER — DOXYCYCLINE HYCLATE 100 MG/1
100 CAPSULE ORAL 2 TIMES DAILY
Qty: 14 CAPSULE | Refills: 0 | Status: SHIPPED | OUTPATIENT
Start: 2019-10-12 | End: 2019-10-19

## 2019-10-12 RX ORDER — OSELTAMIVIR PHOSPHATE 75 MG/1
75 CAPSULE ORAL EVERY 12 HOURS SCHEDULED
Status: DISCONTINUED | OUTPATIENT
Start: 2019-10-12 | End: 2019-10-12 | Stop reason: HOSPADM

## 2019-10-12 RX ORDER — OSELTAMIVIR PHOSPHATE 75 MG/1
75 CAPSULE ORAL EVERY 12 HOURS SCHEDULED
Qty: 9 CAPSULE | Refills: 0 | Status: SHIPPED | OUTPATIENT
Start: 2019-10-12 | End: 2019-10-17

## 2019-10-12 RX ADMIN — ACETAMINOPHEN 650 MG: 325 TABLET, FILM COATED ORAL at 10:12

## 2019-10-12 RX ADMIN — SODIUM CHLORIDE 100 ML/HR: 9 INJECTION, SOLUTION INTRAVENOUS at 00:56

## 2019-10-12 RX ADMIN — OSELTAMIVIR PHOSPHATE 75 MG: 75 CAPSULE ORAL at 11:24

## 2019-10-12 RX ADMIN — VANCOMYCIN HYDROCHLORIDE 1500 MG: 10 INJECTION, POWDER, LYOPHILIZED, FOR SOLUTION INTRAVENOUS at 10:06

## 2019-10-12 RX ADMIN — Medication 10 ML: at 00:56

## 2019-10-12 RX ADMIN — GUAIFENESIN AND PSEUDOEPHEDRINE HYDROCHLORIDE 2 TABLET: 600; 60 TABLET, EXTENDED RELEASE ORAL at 08:25

## 2019-10-12 RX ADMIN — SODIUM CHLORIDE 100 ML/HR: 9 INJECTION, SOLUTION INTRAVENOUS at 10:06

## 2019-10-12 RX ADMIN — IPRATROPIUM BROMIDE AND ALBUTEROL SULFATE 3 ML: 2.5; .5 SOLUTION RESPIRATORY (INHALATION) at 00:48

## 2019-10-12 RX ADMIN — IPRATROPIUM BROMIDE AND ALBUTEROL SULFATE 3 ML: 2.5; .5 SOLUTION RESPIRATORY (INHALATION) at 10:40

## 2019-10-12 RX ADMIN — SODIUM CHLORIDE 2 G: 900 INJECTION INTRAVENOUS at 06:19

## 2019-10-12 RX ADMIN — CETIRIZINE HYDROCHLORIDE 10 MG: 10 TABLET, FILM COATED ORAL at 08:25

## 2019-10-12 NOTE — PROGRESS NOTES
"Pharmacy Dosing Service  Pharmacokinetics  Vancomycin Initial Evaluation    Assessment/Action/Plan:  Based on patient demographic information, adjusted order for Vancomycin 750 mg IVPB every 12 hours to Vancomycin 1,500 mg IVPB every 12 hours, following a 2,000 mg dose given in ER. (Of note: patient is obese and was recently treated for pneumonia with Levaquin. MRSA nasal PCR has been ordered.) Vancomycin levels not ordered at this time. Current vancomycin end date/final dose: 10/18/19 at 2100. Pharmacy will monitor renal function and adjust dose accordingly.     Subjective:  Rich Ahumada is a 33 y.o. female initiated on Vancomycin 1,500 mg IV every 12 hours for the treatment of Pneumonia. (x7 days)    Objective:  Ht: 160 cm (63\"); Wt: 97.1 kg (214 lb)  Estimated Creatinine Clearance: 118.9 mL/min (by C-G formula based on SCr of 0.74 mg/dL).   Lab Results   Component Value Date    CREATININE 0.74 10/11/2019      Lab Results   Component Value Date    WBC 10.16 10/11/2019      Baseline culture results:  Microbiology Results     Respiratory - cough/sputum culture and MRSA nasal PCR swab both pending    Collected  Updated  Procedure      10/11/2019 2102  10/11/2019 2107  Blood Culture - Blood, Arm, Left [[id number]]   Blood from Arm, Left     No result data             10/11/2019 1754  10/11/2019 2047  Blood Culture - Blood, Arm, Right [[id number]]   Blood from Arm, Right     No result data            Jose C Vaughn, PawanD  10/11/19 11:45 PM    "

## 2019-10-12 NOTE — DISCHARGE SUMMARY
[facility] Medicine Services  DISCHARGE SUMMARY       Date of Admission: 10/11/2019  Date of Discharge:  10/12/2019  Primary Care Physician: Trina Finn MD    Presenting Problem/History of Present Illness:  Headache, shortness of breath    Final Discharge Diagnoses:  Active Hospital Problems    Diagnosis   • **Hospital-acquired pneumonia   • Influenza A   • Sepsis due to pneumonia (CMS/HCC)   • Fever   • Sinus tachycardia     Consults: None    Procedures Performed: None    Pertinent Test Results:   Lab Results (all)     Procedure Component Value Units Date/Time    S. Pneumo Ag Urine or CSF - Urine, Urine, Clean Catch [[id number]]  (Normal) Collected:  10/12/19 1115    Specimen:  Urine, Clean Catch Updated:  10/12/19 1144     Strep Pneumo Ag Negative    Legionella Antigen, Urine - Urine, Urine, Clean Catch [[id number]]  (Normal) Collected:  10/12/19 1115    Specimen:  Urine, Clean Catch Updated:  10/12/19 1144     LEGIONELLA ANTIGEN, URINE Negative    Influenza Antigen, Rapid - Swab, Nasopharynx [[id number]]  (Abnormal) Collected:  10/12/19 1019    Specimen:  Swab from Nasopharynx Updated:  10/12/19 1040     Influenza A Ag, EIA Positive     Influenza B Ag, EIA Negative    Narrative:       Recommend confirmation of negative results by viral culture or molecular assay.    Comprehensive Metabolic Panel [[id number]]  (Abnormal) Collected:  10/12/19 0502    Specimen:  Blood Updated:  10/12/19 0556     Glucose 97 mg/dL      BUN 6 mg/dL      Creatinine 0.59 mg/dL      Sodium 139 mmol/L      Potassium 3.6 mmol/L      Chloride 107 mmol/L      CO2 21.0 mmol/L      Calcium 7.8 mg/dL      Total Protein 6.4 g/dL      Albumin 3.10 g/dL      ALT (SGPT) 9 U/L      AST (SGOT) 15 U/L      Alkaline Phosphatase 75 U/L      Total Bilirubin 0.3 mg/dL      eGFR Non African Amer 117 mL/min/1.73      Globulin 3.3 gm/dL      A/G Ratio 0.9 g/dL      BUN/Creatinine Ratio 10.2     Anion Gap 11.0 mmol/L      CBC Auto Differential [[id number]]  (Abnormal) Collected:  10/12/19 0502    Specimen:  Blood Updated:  10/12/19 0554     WBC 5.75 10*3/mm3      RBC 3.98 10*6/mm3      Hemoglobin 11.6 g/dL      Hematocrit 34.5 %      MCV 86.7 fL      MCH 29.1 pg      MCHC 33.6 g/dL      RDW 12.8 %      RDW-SD 40.8 fl      MPV 9.9 fL      Platelets 199 10*3/mm3      Neutrophil % 77.5 %      Lymphocyte % 12.5 %      Monocyte % 7.1 %      Eosinophil % 2.4 %      Basophil % 0.2 %      Immature Grans % 0.3 %      Neutrophils, Absolute 4.45 10*3/mm3      Lymphocytes, Absolute 0.72 10*3/mm3      Monocytes, Absolute 0.41 10*3/mm3      Eosinophils, Absolute 0.14 10*3/mm3      Basophils, Absolute 0.01 10*3/mm3      Immature Grans, Absolute 0.02 10*3/mm3      nRBC 0.0 /100 WBC     MRSA Screen, PCR - Swab, Nares [[id number]] Collected:  10/12/19 0037    Specimen:  Swab from Nares Updated:  10/12/19 0053    Blood Culture - Blood, Arm, Left [[id number]] Collected:  10/11/19 2102    Specimen:  Blood from Arm, Left Updated:  10/11/19 2107    Blood Culture - Blood, Arm, Right [[id number]] Collected:  10/11/19 1754    Specimen:  Blood from Arm, Right Updated:  10/11/19 2047    Blood Gas, Venous [[id number]]  (Abnormal) Collected:  10/11/19 1900    Specimen:  Venous Blood Updated:  10/11/19 1908     Site OTHER     pH, Venous 7.399 pH Units      pCO2, Venous 41.7 mm Hg      pO2, Venous 16.2 mm Hg      Comment: 84 Value below reference range        HCO3, Venous 25.7 mmol/L      Base Excess, Venous 0.7 mmol/L      O2 Saturation, Venous 23.2 %      Comment: 84 Value below reference range        Temperature 37.0 C      Barometric Pressure for Blood Gas 754 mmHg      Modality Room Air     Ventilator Mode NA     Collected by [id number]     Comment: Meter: [id number]     :  [id number]       Comprehensive Metabolic Panel [[id number]]  (Abnormal) Collected:  10/11/19 1757    Specimen:  Blood Updated:  10/11/19 1822     Glucose 98 mg/dL      BUN 8 mg/dL       Creatinine 0.74 mg/dL      Sodium 135 mmol/L      Potassium 4.1 mmol/L      Chloride 98 mmol/L      CO2 23.0 mmol/L      Calcium 8.8 mg/dL      Total Protein 8.3 g/dL      Albumin 3.90 g/dL      ALT (SGPT) 14 U/L      AST (SGOT) 18 U/L      Alkaline Phosphatase 101 U/L      Total Bilirubin 0.3 mg/dL      eGFR Non African Amer 90 mL/min/1.73      Globulin 4.4 gm/dL      A/G Ratio 0.9 g/dL      BUN/Creatinine Ratio 10.8     Anion Gap 14.0 mmol/L     Narrative:       Troponin [[id number]]  (Normal) Collected:  10/11/19 1757    Specimen:  Blood Updated:  10/11/19 1822     Troponin T <0.010 ng/mL     hCG, Serum, Qualitative [[id number]]  (Normal) Collected:  10/11/19 1757    Specimen:  Blood Updated:  10/11/19 1821     HCG Qualitative Negative    Protime-INR [[id number]]  (Normal) Collected:  10/11/19 1757    Specimen:  Blood Updated:  10/11/19 1818     Protime 13.0 Seconds      INR 0.95    aPTT [[id number]]  (Normal) Collected:  10/11/19 1757    Specimen:  Blood Updated:  10/11/19 1818     PTT 30.1 seconds     D-dimer, Quantitative [[id number]]  (Abnormal) Collected:  10/11/19 1757    Specimen:  Blood Updated:  10/11/19 1818     D-Dimer, Quantitative 2.08 mg/L (FEU)     CBC Auto Differential [[id number]]  (Abnormal) Collected:  10/11/19 1757    Specimen:  Blood Updated:  10/11/19 1809     WBC 10.16 10*3/mm3      RBC 4.80 10*6/mm3      Hemoglobin 13.9 g/dL      Hematocrit 41.8 %      MCV 87.1 fL      MCH 29.0 pg      MCHC 33.3 g/dL      RDW 12.7 %      RDW-SD 40.1 fl      MPV 9.4 fL      Platelets 251 10*3/mm3      Neutrophil % 93.0 %      Lymphocyte % 2.6 %      Monocyte % 2.8 %      Eosinophil % 1.1 %      Basophil % 0.2 %      Immature Grans % 0.3 %      Neutrophils, Absolute 9.46 10*3/mm3      Lymphocytes, Absolute 0.26 10*3/mm3      Monocytes, Absolute 0.28 10*3/mm3      Eosinophils, Absolute 0.11 10*3/mm3      Basophils, Absolute 0.02 10*3/mm3      Immature Grans, Absolute 0.03 10*3/mm3      nRBC 0.0 /100 WBC     Red Top  [[id number]] Collected:  10/11/19 1757    Specimen:  Blood Updated:  10/11/19 1802        Imaging Results (all)     Procedure Component Value Units Date/Time    XR Chest 2 View [[id number]] Collected:  10/11/19 1854     Updated:  10/11/19 2032    Addenda:        Addendum: There is consolidation in the right middle lobe best seen on  lateral radiograph. This is better characterized on same-day CT.  This report was finalized on 10/11/2019 20:26 by Dr. Uri Us MD.  Signed:  10/11/19 2026 by Uri Us MD    Narrative:       Exam: XR CHEST 2 VW-     Indication: Harsh cough, recently had pneumonia.     Comparison: None available.     Findings:     Cardiomediastinal silhouette is within normal limits.   No pleural effusion, pneumothorax, or focal consolidation.   No acute osseous findings.       Impression:          No acute findings.  This report was finalized on 10/11/2019 18:54 by Dr. Uri Us MD.    CT Angiogram Chest With Contrast [[id number]] Collected:  10/11/19 2010     Updated:  10/11/19 2032    Narrative:       Exam: CT ANGIOGRAM CHEST W CONTRAST-     Indication: Recent admission for pneumonia.  Now very SOB, tachycardic,  elevated ddimer     Comparison: None available.     DOSE LENGTH PRODUCT: 625 mGy cm. Automated exposure control was also  utilized to decrease patient radiation dose.     Findings:     No evidence of pulmonary embolus. The main pulmonary trunk is  nondilated. No evidence of right heart strain. The thoracic aorta is  normal in caliber.     The central airways are clear. There is tree-in-bud nodularity within  the inferior right upper lobe, the right lower lobe, and within the  right middle lobe. There is also consolidation in the right middle lobe.  12 mm right lower lobe pulmonary nodule. No consolidation or nodules in  the left lung. No pleural effusion or pneumothorax.     Mild mediastinal and right hilar lymphadenopathy.     Gallbladder surgically absent.        Impression:       1. No evidence of pulmonary embolus.  2. Tree-in-bud nodularity throughout the right lung and right middle  lobe consolidation. This likely represents multifocal right lung  pneumonia. Right hilar and mediastinal adenopathy is likely reactive.  3. 11 mm right lower lobe pulmonary nodule, likely  infectious/inflammatory.  This report was finalized on 10/11/2019 20:25 by Dr. Uri Us MD.        History of Present Illness on Day of Discharge:     Hospital Course:  Ms. Ahumada is a pleasant 33-year-old  female who follows Dr. Trina Finn for primary care.  She does not endorse any significant past medical history, but has been treated for pneumonia within the last month at [facility].  She was prescribed a 10-day course of Levaquin and did finish complete course.  She was feeling relatively well after treatment, but in the last 24 hours started to feel unwell again with sudden fever, cough, and headache.  She presented to the [facility] emergency department on 10/11/2019 with these complaints.  In the emergency department, laboratory work-up was essentially unremarkable other than mildly elevated d-dimer.  A CT angiogram of the chest was performed which showed tree-in-bud nodularity throughout the right lung and right middle lobe.  She also has an inflammatory right lower lobe pulmonary nodule.  The patient was admitted to the hospitalist service for further evaluation and management.    The patient did meet sepsis criteria with fever and tachycardia on admission.  She was given appropriate sepsis bolus in the emergency department gentle IV fluids thereafter.  She was placed on IV vancomycin and Azactam.  We did perform a flu swab which was positive for influenza A.  She was started on Tamiflu immediately.  Her tachycardia has resolved with fluid resuscitation and her fever has resolved as well.  She has not required supplemental oxygen.    At discharge, we  "will continue antibiotic therapy and provide staphylococcal coverage with doxycycline.  She will be prescribed Augmentin as well and will need to complete a 7-day course of these medications.  She has been advised to take an over-the-counter probiotic.  We have called in prophylactic dosing of Tamiflu for her  and 3 children as well.  Overall, the patient is hemodynamically stable and appropriate for discharge home today.  She will follow-up with her primary care provider in 1 week.    Condition on Discharge:  Stable    Physical Exam on Discharge:  /66 (BP Location: Left arm, Patient Position: Sitting)   Pulse 95   Temp 98.6 °F (37 °C) (Oral)   Resp 16   Ht 160 cm (63\")   Wt 97.1 kg (214 lb)   LMP 10/03/2019   SpO2 98%   Breastfeeding? No   BMI 37.91 kg/m²   Physical Exam   Constitutional: She is oriented to person, place, and time. She appears well-developed and well-nourished. No distress.   HENT:   Head: Normocephalic and atraumatic.   Neck: Normal range of motion. Neck supple. No JVD present. No tracheal deviation present.   Cardiovascular: Normal rate, regular rhythm and intact distal pulses. Exam reveals no gallop and no friction rub.   Sinus- sinus tach . Diminished in the right base   Pulmonary/Chest: Effort normal. She has no wheezes. She has no rales.   Rhonchi   Abdominal: Soft. Bowel sounds are normal. She exhibits no distension. There is no tenderness. There is no guarding.   Musculoskeletal: Normal range of motion. She exhibits no edema or tenderness.   Neurological: She is alert and oriented to person, place, and time. No cranial nerve deficit.   Skin: Skin is warm and dry. No rash noted. No erythema.   Psychiatric: She has a normal mood and affect. Her behavior is normal. Judgment and thought content normal.   Vitals reviewed.    Discharge Disposition:  Home or Self Care    Discharge Medications:     Discharge Medications      New Medications      Instructions Start Date "   amoxicillin-clavulanate 875-125 MG per tablet  Commonly known as:  AUGMENTIN   1 tablet, Oral, 2 Times Daily      doxycycline 100 MG capsule  Commonly known as:  VIBRAMYCIN   100 mg, Oral, 2 Times Daily      oseltamivir 75 MG capsule  Commonly known as:  TAMIFLU   75 mg, Oral, Every 12 Hours Scheduled         Continue These Medications      Instructions Start Date   CLARITIN 10 MG tablet  Generic drug:  loratadine   10 mg, Oral, Daily      ibuprofen 800 MG tablet  Commonly known as:  ADVIL,MOTRIN   800 mg, Oral, Every 8 Hours PRN      JUNEL FE 1.5/30 PO   Oral, Daily      MUCINEX D MAX STRENGTH 120-1200 MG tablet sustained-release 12 hour  Generic drug:  Pseudoephedrine-guaiFENesin ER   Oral, Daily, Pt has been taking once a day for the past month since being sick           Discharge Diet:   Diet Instructions     Diet: Regular; Thin      Discharge Diet:  Regular    Fluid Consistency:  Thin        Activity at Discharge:   Activity Instructions     Activity as Tolerated          Discharge Care Plan/Instructions:   1.  Return for any acute or worsening symptoms  2.  Patient advised to take an over-the-counter probiotic while on antibiotic therapy  3.  Tamiflu x 5 days.  Prophylactic Tamiflu called in for her  and 3 children as well.    Follow-up Appointments:   1.  Dr. Trina Finn in 1 week    Test Results Pending at Discharge: We will follow MRSA nasal screen and blood cultures to completion.    MILLI Qiu  10/12/19  11:54 AM    Time: 25 minutes    I personally evaluated and examined the patient in conjunction with MILLI Salguero and agree with the assessment, treatment plan, and disposition of the patient as recorded by her. My history, exam, and further recommendations are:     Discussed with her nurse, Padma.    Seen and discussed with her .    She feels much, much better.  She would like to convalesce at home.  We will discharge her on Augmentin and doxycycline for bacterial  pneumonia.  This will cover Staphylococcus as a possibility as well.  She has tolerated amoxicillin as an outpatient in the past.  She will be treated with Tamiflu.  I also called in prophylactic Tamiflu for her  and sons to the [facility] in [place].    You can see my progress note from earlier today as well that outlines further details of my encounter with the patient and her .    Alvaro Richardson, DO  10/12/19  1:26 PM

## 2019-10-12 NOTE — PROGRESS NOTES
[facility] Medicine Services  INPATIENT PROGRESS NOTE    Length of Stay: 0  Date of Admission: 10/11/2019  Primary Care Physician: Trina Finn MD    Subjective   Chief Complaint: Follow-up headache  HPI   Patient resting in bed with  at bedside.  She reports feeling much better today.  She denies any shortness of breath.  She has some pleuritic type chest pain only with coughing.  She feels that her cough has decreased in frequency.  She is not really producing any sputum.  Her headache is improved.  She states she was hospitalized 1 month ago for pneumonia at [facility].  She was hospitalized for about 24 hours.  She finished a 10-day course of Levaquin.  She has been feeling relatively well after hospitalization, but states her symptoms came on very suddenly yesterday.      Review of Systems   All pertinent negatives and positives are as above. All other systems have been reviewed and are negative unless otherwise stated.     Objective    Temp:  [98.3 °F (36.8 °C)-101.2 °F (38.4 °C)] 98.3 °F (36.8 °C)  Heart Rate:  [] (P) 94  Resp:  [16-20] (P) 16  BP: (104-147)/(57-94) 104/62  Physical Exam   Constitutional: She is oriented to person, place, and time. She appears well-developed and well-nourished. No distress.   HENT:   Head: Normocephalic and atraumatic.   Neck: Normal range of motion. Neck supple. No JVD present. No tracheal deviation present.   Cardiovascular: Normal rate, regular rhythm and intact distal pulses. Exam reveals no gallop and no friction rub.   Sinus- sinus tach . Diminished in the right base   Pulmonary/Chest: Effort normal. She has no wheezes. She has no rales.   Rhonchi   Abdominal: Soft. Bowel sounds are normal. She exhibits no distension. There is no tenderness. There is no guarding.   Musculoskeletal: Normal range of motion. She exhibits no edema or tenderness.   Neurological: She is alert and oriented to person,  place, and time. No cranial nerve deficit.   Skin: Skin is warm and dry. No rash noted. No erythema.   Psychiatric: She has a normal mood and affect. Her behavior is normal. Judgment and thought content normal.   Vitals reviewed.    Results Review:  I have reviewed the labs, radiology results, and diagnostic studies.    Laboratory Data:   Results from last 7 days   Lab Units 10/12/19  0502 10/11/19  1757   WBC 10*3/mm3 5.75 10.16   HEMOGLOBIN g/dL 11.6* 13.9   HEMATOCRIT % 34.5 41.8   PLATELETS 10*3/mm3 199 251     Results from last 7 days   Lab Units 10/12/19  0502 10/11/19  1757   SODIUM mmol/L 139 135*   POTASSIUM mmol/L 3.6 4.1   CHLORIDE mmol/L 107 98   CO2 mmol/L 21.0* 23.0   BUN mg/dL 6 8   CREATININE mg/dL 0.59 0.74   CALCIUM mg/dL 7.8* 8.8   BILIRUBIN mg/dL 0.3 0.3   ALK PHOS U/L 75 101   ALT (SGPT) U/L 9 14   AST (SGOT) U/L 15 18   GLUCOSE mg/dL 97 98     Radiology Data:   Imaging Results (last 24 hours)     Procedure Component Value Units Date/Time    XR Chest 2 View [[id number]] Collected:  10/11/19 1854     Updated:  10/11/19 2032    Addenda:        Addendum: There is consolidation in the right middle lobe best seen on  lateral radiograph. This is better characterized on same-day CT.  This report was finalized on 10/11/2019 20:26 by Dr. Uri Us MD.  Signed:  10/11/19 2026 by Uri Us MD    Narrative:       Exam: XR CHEST 2 VW-     Indication: Harsh cough, recently had pneumonia.     Comparison: None available.     Findings:     Cardiomediastinal silhouette is within normal limits.   No pleural effusion, pneumothorax, or focal consolidation.   No acute osseous findings.       Impression:          No acute findings.  This report was finalized on 10/11/2019 18:54 by Dr. Uri Us MD.    CT Angiogram Chest With Contrast [[id number]] Collected:  10/11/19 2010     Updated:  10/11/19 2032    Narrative:       Exam: CT ANGIOGRAM CHEST W CONTRAST-     Indication: Recent admission for  pneumonia.  Now very SOB, tachycardic,  elevated ddimer     Comparison: None available.     DOSE LENGTH PRODUCT: 625 mGy cm. Automated exposure control was also  utilized to decrease patient radiation dose.     Findings:     No evidence of pulmonary embolus. The main pulmonary trunk is  nondilated. No evidence of right heart strain. The thoracic aorta is  normal in caliber.     The central airways are clear. There is tree-in-bud nodularity within  the inferior right upper lobe, the right lower lobe, and within the  right middle lobe. There is also consolidation in the right middle lobe.  12 mm right lower lobe pulmonary nodule. No consolidation or nodules in  the left lung. No pleural effusion or pneumothorax.     Mild mediastinal and right hilar lymphadenopathy.     Gallbladder surgically absent.       Impression:       1. No evidence of pulmonary embolus.  2. Tree-in-bud nodularity throughout the right lung and right middle  lobe consolidation. This likely represents multifocal right lung  pneumonia. Right hilar and mediastinal adenopathy is likely reactive.  3. 11 mm right lower lobe pulmonary nodule, likely  infectious/inflammatory.  This report was finalized on 10/11/2019 20:25 by Dr. Uri Us MD.        I have reviewed the patient current medications.     Assessment/Plan     Active Hospital Problems    Diagnosis   • **Hospital-acquired pneumonia   • Sepsis due to pneumonia (CMS/[facility])   • Influenza A   • Fever   • Sinus tachycardia     Plan:  1.  Continue vancomycin and Azactam for now, feel that these can be de-escalated quickly.  MRSA screen is pending.  2.  Continue Duonebs and Mucinex-D.  Encouraged use of flutter valve.  3.  Patient is non-toxic in appearance.  Her vital signs are much improved.  Discontinue and monitor off of IV fluids.  4.  Influenza A positive- start Tamiflu.  Check urinary antigens for strep and Legionella.  Blood cultures pending.  Respiratory culture has not been collected.  5.   Monitor fever curve  6.  Increase activity  7.  Labs in AM     Discharge Planning: I expect the patient to be discharged to home tomorrow.    MILLI Qiu   10/12/19   10:45 AM     I personally evaluated and examined the patient in conjunction with MILLI Salguero and agree with the assessment, treatment plan, and disposition of the patient as recorded by her. My history, exam, and further recommendations are:     Discussed with her nurse, Padma.     She apparently was treated for pneumonia [place] ago and completed a course of Levaquin.    She is found to have tree-in-bud nodularity throughout the right lung and right middle lobe validation.  She also has an inflammatory nodule on the right lower lobe.  She has swabbed positive for influenza A.  She will be started on Tamiflu.      She took the seasonal flu shot last year.  She states that she is still got influenza B.  She has not taken the flu shot this year.  Her  has.    She needs Staphylococcal coverage with regards to her ongoing antibacterials.  She is currently on vancomycin.  She will likely be treated with doxycycline and Augmentin at discharge.  She is allergic to Bactrim and Ceclor.    She states that she feels considerably better and would really like to go home later this afternoon.  She is currently receiving a dose of vancomycin. Her  is supportive of her going home.  She is not requiring any oxygen and her vitals are good.  She has been in close contact with her  and their 3 sons.  I told them that I have no problem writing prophylaxis for them.    Alvaro Richardson, DO  10/12/19  10:52 AM    I was able to speak to the pharmacist at the [facility] in [place].  I have called in the following prophylactic descriptions for Tamiflu:    Mitesh Ahumada, [birth year], 75 mg daily for 7 days.  Jese Ahumada, 68 kg, 12/28/06, 75 mg for 7 days.  Yvon Ahumada, 48 kg, 12/16/2010, 75 mg for 7 days.  Jori Ahumada, 16 kg, 5/16/2017, 45 mg  daily for 7 days (suspension).    (1 year or older and greater than 15 to 23 kg) 45 mg orally once daily for 7 days following exposure        (1 year or older and greater than 40 kg) 75 mg orally once daily for 7 days      following exposure    Alvaro Richardson DO  10/12/19  11:24 AM

## 2019-10-12 NOTE — H&P
[facility] Medicine Services  HISTORY AND PHYSICAL    Date of Admission: 10/11/2019  Primary Care Physician: Trina Finn MD    Subjective     Chief Complaint: Headache    History of Present Illness  Pt is a 34 yo female who presents with a headache that started this morning. This progressed to a nonproductive cough, right chest pain, and fever over the course of the day. She denies any SOA or nausea/vomiting. She went to her PCP, as she had just gotten over an episode of pneumonia 4 days prior. 2 weeks ago she had similar symptoms, cough and SOA, was admitted, and given Levaquin for pneumonia. She had been feeling well for the last 4 days until symptoms started this morning. No abdominal pian, nausea, vomiting, or diarrhea.         Review of Systems   Headache  Fever  Cough  Right sided chest pain    Otherwise complete ROS reviewed and negative except as mentioned in the HPI.    Past Medical History: No past medical history on file.  Past Surgical History:  Past Surgical History:   Procedure Laterality Date   • CHOLECYSTECTOMY     • ORTHOPEDIC SURGERY Left 04/1999     Social History:  reports that she has never smoked. She does not have any smokeless tobacco history on file. She reports that she does not drink alcohol or use drugs.    Family History: family history is not on file.       Allergies:  Allergies   Allergen Reactions   • Ceclor [Cefaclor] Swelling   • Tetanus Toxoids Hives   • Bactrim [Sulfamethoxazole-Trimethoprim] Rash     Medications:  Prior to Admission medications    Medication Sig Start Date End Date Taking? Authorizing Provider   cetirizine (zyrTEC) 10 MG tablet Take 10 mg by mouth Daily.    Provider, MD Zabrina   ibuprofen (ADVIL,MOTRIN) 800 MG tablet Take 1 tablet by mouth Every 8 (Eight) Hours As Needed for Mild Pain (1-3). 5/18/17   Cookie Palomino MD   Prenatal Vit-Fe Fumarate-FA (PRENATAL, CLASSIC, VITAMIN) 28-0.8 MG tablet tablet Take  "1 tablet by mouth Daily.    Provider, MD Zabrina     Objective     Vital Signs: /58 (BP Location: Left arm, Patient Position: Lying)   Pulse (!) 134 Comment: Yamile RN Aware   Temp (!) 101.2 °F (38.4 °C) (Oral) Comment: Yamile RN Aware  Resp 18   Ht 160 cm (63\")   Wt 97.1 kg (214 lb)   LMP 10/03/2019 (Approximate)   SpO2 100%   Breastfeeding? No   BMI 37.91 kg/m²   Physical Exam   HENT:   Head: Normocephalic and atraumatic.   Nose: Nose normal.   Mouth/Throat: Oropharynx is clear and moist.   Eyes: Conjunctivae and EOM are normal.   Neck: Normal range of motion. Neck supple.   Cardiovascular: Regular rhythm and normal heart sounds.   Tachycardic.   Pulmonary/Chest: Effort normal. No respiratory distress.   Congestion.    Abdominal: Soft. Bowel sounds are normal.   Musculoskeletal: She exhibits no edema or tenderness.   Neurological: She is alert. No cranial nerve deficit.   Skin: Skin is warm and dry.   Psychiatric: She has a normal mood and affect.   Vitals reviewed.          Results Reviewed:  Lab Results (last 24 hours)     Procedure Component Value Units Date/Time    Blood Culture - Blood, Arm, Left [[id number]] Collected:  10/11/19 2102    Specimen:  Blood from Arm, Left Updated:  10/11/19 2107    Blood Culture - Blood, Arm, Right [[id number]] Collected:  10/11/19 1754    Specimen:  Blood from Arm, Right Updated:  10/11/19 2047    Blood Gas, Venous [[id number]]  (Abnormal) Collected:  10/11/19 1900    Specimen:  Venous Blood Updated:  10/11/19 1908     Site OTHER     pH, Venous 7.399 pH Units      pCO2, Venous 41.7 mm Hg      pO2, Venous 16.2 mm Hg      Comment: 84 Value below reference range        HCO3, Venous 25.7 mmol/L      Base Excess, Venous 0.7 mmol/L      O2 Saturation, Venous 23.2 %      Comment: 84 Value below reference range        Temperature 37.0 C      Barometric Pressure for Blood Gas 754 mmHg      Modality Room Air     Ventilator Mode NA     Collected by [id number]     Comment: " Meter: [id number]     :  [id number]       [place] Draw [[id number]] Collected:  10/11/19 1757    Specimen:  Blood Updated:  10/11/19 1900    Narrative:       The following orders were created for panel order [place] Draw.  Procedure                               Abnormality         Status                     ---------                               -----------         ------                     Light Blue Top[[id number]]                                   Final result               Green Top (Gel)[[id number]]                                  Final result               Lavender Top[[id number]]                                     Final result               Red Top[[id number]]                                          In process                   Please view results for these tests on the individual orders.    Light Blue Top [[id number]] Collected:  10/11/19 1757    Specimen:  Blood Updated:  10/11/19 1900     Extra Tube hold for add-on     Comment: Auto resulted       Lavender Top [[id number]] Collected:  10/11/19 1757    Specimen:  Blood Updated:  10/11/19 1900     Extra Tube hold for add-on     Comment: Auto resulted       Green Top (Gel) [[id number]] Collected:  10/11/19 1757    Specimen:  Blood Updated:  10/11/19 1900     Extra Tube Hold for add-ons.     Comment: Auto resulted.       Comprehensive Metabolic Panel [[id number]]  (Abnormal) Collected:  10/11/19 1757    Specimen:  Blood Updated:  10/11/19 1822     Glucose 98 mg/dL      BUN 8 mg/dL      Creatinine 0.74 mg/dL      Sodium 135 mmol/L      Potassium 4.1 mmol/L      Chloride 98 mmol/L      CO2 23.0 mmol/L      Calcium 8.8 mg/dL      Total Protein 8.3 g/dL      Albumin 3.90 g/dL      ALT (SGPT) 14 U/L      AST (SGOT) 18 U/L      Alkaline Phosphatase 101 U/L      Total Bilirubin 0.3 mg/dL      eGFR Non African Amer 90 mL/min/1.73      Globulin 4.4 gm/dL      A/G Ratio 0.9 g/dL      BUN/Creatinine Ratio 10.8     Anion Gap 14.0 mmol/L     Narrative:       GFR  Normal >60  Chronic Kidney Disease <60  Kidney Failure <15    Troponin [[id number]]  (Normal) Collected:  10/11/19 1757    Specimen:  Blood Updated:  10/11/19 1822     Troponin T <0.010 ng/mL     Narrative:       Troponin T Reference Range:  <= 0.03 ng/mL-   Negative for AMI  >0.03 ng/mL-     Abnormal for myocardial necrosis.  Clinicians would have to utilize clinical acumen, EKG, Troponin and serial changes to determine if it is an Acute Myocardial Infarction or myocardial injury due to an underlying chronic condition.     hCG, Serum, Qualitative [[id number]]  (Normal) Collected:  10/11/19 1757    Specimen:  Blood Updated:  10/11/19 1821     HCG Qualitative Negative    Protime-INR [[id number]]  (Normal) Collected:  10/11/19 1757    Specimen:  Blood Updated:  10/11/19 1818     Protime 13.0 Seconds      INR 0.95    aPTT [[id number]]  (Normal) Collected:  10/11/19 1757    Specimen:  Blood Updated:  10/11/19 1818     PTT 30.1 seconds     D-dimer, Quantitative [[id number]]  (Abnormal) Collected:  10/11/19 1757    Specimen:  Blood Updated:  10/11/19 1818     D-Dimer, Quantitative 2.08 mg/L (FEU)     Narrative:       Reference Range is 0-0.50 mg/L FEU. However, results <0.50 mg/L FEU tends to rule out DVT or PE. Results >0.50 mg/L FEU are not useful in predicting absence or presence of DVT or PE.    CBC & Differential [[id number]] Collected:  10/11/19 1757    Specimen:  Blood Updated:  10/11/19 1809    Narrative:       The following orders were created for panel order CBC & Differential.  Procedure                               Abnormality         Status                     ---------                               -----------         ------                     CBC Auto Differential[[id number]]        Abnormal            Final result                 Please view results for these tests on the individual orders.    CBC Auto Differential [[id number]]  (Abnormal) Collected:  10/11/19 1757    Specimen:  Blood Updated:  10/11/19 1809      WBC 10.16 10*3/mm3      RBC 4.80 10*6/mm3      Hemoglobin 13.9 g/dL      Hematocrit 41.8 %      MCV 87.1 fL      MCH 29.0 pg      MCHC 33.3 g/dL      RDW 12.7 %      RDW-SD 40.1 fl      MPV 9.4 fL      Platelets 251 10*3/mm3      Neutrophil % 93.0 %      Lymphocyte % 2.6 %      Monocyte % 2.8 %      Eosinophil % 1.1 %      Basophil % 0.2 %      Immature Grans % 0.3 %      Neutrophils, Absolute 9.46 10*3/mm3      Lymphocytes, Absolute 0.26 10*3/mm3      Monocytes, Absolute 0.28 10*3/mm3      Eosinophils, Absolute 0.11 10*3/mm3      Basophils, Absolute 0.02 10*3/mm3      Immature Grans, Absolute 0.03 10*3/mm3      nRBC 0.0 /100 WBC     Red Top [[id number]] Collected:  10/11/19 1757    Specimen:  Blood Updated:  10/11/19 1802        Imaging Results (last 24 hours)     Procedure Component Value Units Date/Time    XR Chest 2 View [[id number]] Collected:  10/11/19 1854     Updated:  10/11/19 2032    Addenda:        Addendum: There is consolidation in the right middle lobe best seen on  lateral radiograph. This is better characterized on same-day CT.  This report was finalized on 10/11/2019 20:26 by Dr. Uri Us MD.  Signed:  10/11/19 2026 by Uri Us MD    Narrative:       Exam: XR CHEST 2 VW-     Indication: Harsh cough, recently had pneumonia.     Comparison: None available.     Findings:     Cardiomediastinal silhouette is within normal limits.   No pleural effusion, pneumothorax, or focal consolidation.   No acute osseous findings.       Impression:          No acute findings.  This report was finalized on 10/11/2019 18:54 by Dr. Uri Us MD.    CT Angiogram Chest With Contrast [[id number]] Collected:  10/11/19 2010     Updated:  10/11/19 2032    Narrative:       Exam: CT ANGIOGRAM CHEST W CONTRAST-     Indication: Recent admission for pneumonia.  Now very SOB, tachycardic,  elevated ddimer     Comparison: None available.     DOSE LENGTH PRODUCT: 625 mGy cm. Automated exposure control was  also  utilized to decrease patient radiation dose.     Findings:     No evidence of pulmonary embolus. The main pulmonary trunk is  nondilated. No evidence of right heart strain. The thoracic aorta is  normal in caliber.     The central airways are clear. There is tree-in-bud nodularity within  the inferior right upper lobe, the right lower lobe, and within the  right middle lobe. There is also consolidation in the right middle lobe.  12 mm right lower lobe pulmonary nodule. No consolidation or nodules in  the left lung. No pleural effusion or pneumothorax.     Mild mediastinal and right hilar lymphadenopathy.     Gallbladder surgically absent.       Impression:       1. No evidence of pulmonary embolus.  2. Tree-in-bud nodularity throughout the right lung and right middle  lobe consolidation. This likely represents multifocal right lung  pneumonia. Right hilar and mediastinal adenopathy is likely reactive.  3. 11 mm right lower lobe pulmonary nodule, likely  infectious/inflammatory.  This report was finalized on 10/11/2019 20:25 by Dr. Uri Us MD.        I have personally reviewed and interpreted the radiology studies and ECG obtained at time of admission.     Assessment / Plan     Assessment:   Active Hospital Problems    Diagnosis   • Hospital-acquired pneumonia     Impression:   1. Hospital Acquired Pneumonia, multifocal right lung  2. Mild hyponatremia  3. Tachycardic  4. Elevated d-dimer, negative CT chest to ruleout PE  5. 11 mm right lower lobe pulmonary nodule, likely infectious/inflammatory.      Plan:   1. Antibiotics, vancomycin and azactam  2. IV Fluids  3. Recheck labs in the morning  4. Cardiac monitoring  5. Breathing treatments-Duonebs        Code Status: full     I discussed the patient's findings and my recommendations with the patient and  at bedside    Estimated length of stay 1-2 days    Jason Pratt DO   10/11/19   10:40 PM

## 2019-10-14 LAB
BACTERIA SPEC RESP CULT: NORMAL
GRAM STN SPEC: NORMAL

## 2019-10-16 LAB
BACTERIA SPEC AEROBE CULT: NORMAL
BACTERIA SPEC AEROBE CULT: NORMAL

## 2020-08-25 LAB — HOLD SPECIMEN: NORMAL

## 2023-05-08 ENCOUNTER — OFFICE VISIT (OUTPATIENT)
Dept: GASTROENTEROLOGY | Facility: CLINIC | Age: 38
End: 2023-05-08
Payer: COMMERCIAL

## 2023-05-08 VITALS
TEMPERATURE: 96.6 F | BODY MASS INDEX: 38.98 KG/M2 | HEART RATE: 94 BPM | SYSTOLIC BLOOD PRESSURE: 124 MMHG | WEIGHT: 220 LBS | HEIGHT: 63 IN | DIASTOLIC BLOOD PRESSURE: 84 MMHG | OXYGEN SATURATION: 100 %

## 2023-05-08 DIAGNOSIS — K21.9 GASTROESOPHAGEAL REFLUX DISEASE, UNSPECIFIED WHETHER ESOPHAGITIS PRESENT: ICD-10-CM

## 2023-05-08 DIAGNOSIS — R13.19 ESOPHAGEAL DYSPHAGIA: Primary | ICD-10-CM

## 2023-05-08 PROCEDURE — 99204 OFFICE O/P NEW MOD 45 MIN: CPT | Performed by: NURSE PRACTITIONER

## 2023-05-08 RX ORDER — LEVOCETIRIZINE DIHYDROCHLORIDE 5 MG/1
5 TABLET, FILM COATED ORAL EVERY EVENING
COMMUNITY

## 2023-05-08 RX ORDER — PANTOPRAZOLE SODIUM 40 MG/1
40 TABLET, DELAYED RELEASE ORAL DAILY
COMMUNITY

## 2023-05-08 RX ORDER — FAMOTIDINE 40 MG/1
40 TABLET, FILM COATED ORAL NIGHTLY
COMMUNITY

## 2023-05-08 NOTE — PROGRESS NOTES
[facility] GASTROENTEROLOGY - OFFICE NOTE    5/8/2023    Rich Ahumada   1985    Primary Physician: Trina Finn MD    Chief Complaint   Patient presents with   • Heartburn   • Difficulty Swallowing         HISTORY OF PRESENT ILLNESS:     Rich Ahumada is a 37 y.o. female presents with dysphagia and heartburn.     Dysphagia   Started 3 mo ago, intermittently. Noted with solid food. She points to the upper chest where this occurs. Has taken a drink but makes worse. Has had to regurgitate to get relief.       Heartburn   For several years. Not controlled now. Started pantoprazole 2 weeks ago. Also taking pepcid at night. Was on otc omeprazole daily for yrs that did not help.  Also used tums. These meds are helping. Caffeine: coffee 1 cup/day, 1 soda per day. Food triggers: onions , tomato, peppers, garlic. No tobacco.       No history of egd.     Past Medical History:   Diagnosis Date   • GERD (gastroesophageal reflux disease)    • Pneumonia        Past Surgical History:   Procedure Laterality Date   • CHOLECYSTECTOMY     • ORTHOPEDIC SURGERY Left 04/1999   • WISDOM TOOTH EXTRACTION         Outpatient Medications Marked as Taking for the 5/8/23 encounter (Office Visit) with Ngoc Davidson APRN   Medication Sig Dispense Refill   • famotidine (Pepcid) 40 MG tablet Take 1 tablet by mouth Every Night.     • levocetirizine (XYZAL) 5 MG tablet Take 1 tablet by mouth Every Evening.     • pantoprazole (PROTONIX) 40 MG EC tablet Take 1 tablet by mouth Daily.         Allergies   Allergen Reactions   • Ceclor [Cefaclor] Swelling   • Tetanus Toxoids Hives   • Bactrim [Sulfamethoxazole-Trimethoprim] Rash       Social History     Socioeconomic History   • Marital status:    Tobacco Use   • Smoking status: Never   • Smokeless tobacco: Never   Substance and Sexual Activity   • Alcohol use: Yes     Comment: occ   • Drug use: No   • Sexual activity: Yes     Partners: Male     Birth control/protection: Other  "      Family History   Problem Relation Age of Onset   • Heart disease Mother    • Hypertension Mother    • Colon cancer Neg Hx    • Colon polyps Neg Hx        Review of Systems   Constitutional: Negative for chills, fever and unexpected weight change.   HENT: Positive for trouble swallowing.    Respiratory: Negative for shortness of breath.    Cardiovascular: Negative for chest pain.   Gastrointestinal: Negative for abdominal distention, abdominal pain, anal bleeding, blood in stool, constipation, diarrhea, nausea and vomiting.        Vitals:    05/08/23 1348   BP: 124/84   Pulse: 94   Temp: 96.6 °F (35.9 °C)   SpO2: 100%   Weight: 99.8 kg (220 lb)   Height: 160 cm (63\")      Body mass index is 38.97 kg/m².    Physical Exam  Vitals reviewed.   Constitutional:       General: She is not in acute distress.  Cardiovascular:      Rate and Rhythm: Normal rate and regular rhythm.      Heart sounds: Normal heart sounds.   Pulmonary:      Effort: Pulmonary effort is normal.      Breath sounds: Normal breath sounds.   Abdominal:      General: Bowel sounds are normal. There is no distension.      Palpations: Abdomen is soft.      Tenderness: There is no abdominal tenderness.   Skin:     General: Skin is warm and dry.   Neurological:      Mental Status: She is alert.                 ASSESSMENT AND PLAN    Assessment & Plan     Diagnoses and all orders for this visit:    1. Esophageal dysphagia (Primary)  -     Case Request; Standing  -     Case Request    2. Gastroesophageal reflux disease, unspecified whether esophagitis present  -     Case Request; Standing  -     Case Request    Other orders  -     Implement Anesthesia Orders Day of Procedure; Standing  -     Obtain Informed Consent; Standing      In regards to dysphagia, differential diagnosis discussed.  I recommend cut food in small pieces and chew well.  I recommend upper endoscopy for further evaluation and the patient is agreeable.      In regards to reflux, I " discussed non pharmaceutical treatment of gerd.  This includes gradually losing weight to achieve ideal body wt., elevation of the head of bed by 4-6 inches, nothing to eat or drink 3 hours prior to lying down, avoiding tight clothing, stress reduction, tobacco cessation, reduction of alcohol intake, and dietary restrictions (avoiding caffeine, coffee, fatty foods, mints, chocolate, spicy foods and tomato based sauces as much as possible). I recommend continue protonix daily. Await egd.         ESOPHAGOGASTRODUODENOSCOPY WITH ANESTHESIA (N/A)  Risk, benefits, and alternatives of endoscopy were explained in full.  They understand that there is a risk of bleeding, perforation, and infection.  The risk of perforation goes up with esophageal dilation.  Other options to evaluate UGI complaints could involve barium swallow or UGI series, but these would be diagnostic tests only.  Patient was given time to ask questions.  I answered them to their satisfaction and they are agreeable to proceeding         No follow-ups on file.          There are no Patient Instructions on file for this visit.      MILLI Webber

## 2023-06-27 ENCOUNTER — HOSPITAL ENCOUNTER (OUTPATIENT)
Facility: HOSPITAL | Age: 38
Setting detail: HOSPITAL OUTPATIENT SURGERY
Discharge: HOME | End: 2023-06-27
Attending: INTERNAL MEDICINE | Admitting: INTERNAL MEDICINE
Payer: COMMERCIAL

## 2023-06-27 VITALS
HEART RATE: 87 BPM | WEIGHT: 223 LBS | BODY MASS INDEX: 39.51 KG/M2 | TEMPERATURE: 97.2 F | RESPIRATION RATE: 20 BRPM | DIASTOLIC BLOOD PRESSURE: 72 MMHG | OXYGEN SATURATION: 95 % | SYSTOLIC BLOOD PRESSURE: 116 MMHG | HEIGHT: 63 IN

## 2023-06-27 DIAGNOSIS — R13.19 ESOPHAGEAL DYSPHAGIA: ICD-10-CM

## 2023-06-27 DIAGNOSIS — K21.9 GASTROESOPHAGEAL REFLUX DISEASE, UNSPECIFIED WHETHER ESOPHAGITIS PRESENT: ICD-10-CM

## 2023-06-27 LAB — B-HCG UR QL: NEGATIVE

## 2023-06-27 PROCEDURE — 88305 TISSUE EXAM BY PATHOLOGIST: CPT | Performed by: INTERNAL MEDICINE

## 2023-06-27 PROCEDURE — 81025 URINE PREGNANCY TEST: CPT | Performed by: NURSE ANESTHETIST, CERTIFIED REGISTERED

## 2023-06-27 PROCEDURE — 43248 EGD GUIDE WIRE INSERTION: CPT | Performed by: INTERNAL MEDICINE

## 2023-06-27 PROCEDURE — 43239 EGD BIOPSY SINGLE/MULTIPLE: CPT | Performed by: INTERNAL MEDICINE

## 2023-06-27 RX ORDER — SODIUM CHLORIDE 9 MG/ML
500 INJECTION, SOLUTION INTRAVENOUS CONTINUOUS PRN
Status: DISCONTINUED | OUTPATIENT
Start: 2023-06-27 | End: 2023-06-27 | Stop reason: HOSPADM

## 2023-06-27 RX ORDER — ONDANSETRON 2 MG/ML
4 INJECTION INTRAMUSCULAR; INTRAVENOUS ONCE AS NEEDED
Status: DISCONTINUED | OUTPATIENT
Start: 2023-06-27 | End: 2023-06-27 | Stop reason: HOSPADM

## 2023-06-27 RX ORDER — SODIUM CHLORIDE 0.9 % (FLUSH) 0.9 %
10 SYRINGE (ML) INJECTION AS NEEDED
Status: DISCONTINUED | OUTPATIENT
Start: 2023-06-27 | End: 2023-06-27 | Stop reason: HOSPADM

## 2023-06-27 RX ADMIN — SODIUM CHLORIDE 500 ML: 9 INJECTION, SOLUTION INTRAVENOUS at 07:25

## 2023-06-27 NOTE — H&P
[facility] Gastroenterology  Pre Procedure History & Physical    Chief Complaint:   Dysphagia    Subjective     HPI:   The patient complained of dysphagia.  She has been having heartburn.  Notes that the heartburn is better since being on Protonix in the mornings and Pepcid in the evenings.  Along with her dysphagia has improved.  Still has occasional.  She presents for endoscopy evaluation today.    Past Medical History:   Past Medical History:   Diagnosis Date    GERD (gastroesophageal reflux disease)     Pneumonia        Past Surgical History:  Past Surgical History:   Procedure Laterality Date    CHOLECYSTECTOMY      ORTHOPEDIC SURGERY Left 04/1999    WISDOM TOOTH EXTRACTION         Family History:  Family History   Problem Relation Age of Onset    Heart disease Mother     Hypertension Mother     Colon cancer Neg Hx     Colon polyps Neg Hx        Social History:   reports that she has never smoked. She has never used smokeless tobacco. She reports current alcohol use. She reports that she does not use drugs.    Medications:   Prior to Admission medications    Medication Sig Start Date End Date Taking? Authorizing Provider   famotidine (PEPCID) 40 MG tablet Take 1 tablet by mouth Every Night.   Yes Zabrina Gonsalez MD   levocetirizine (XYZAL) 5 MG tablet Take 1 tablet by mouth Every Evening.   Yes ProviderZabrina MD   pantoprazole (PROTONIX) 40 MG EC tablet Take 1 tablet by mouth Daily.   Yes ProviderZabrina MD   ibuprofen (ADVIL,MOTRIN) 800 MG tablet Take 1 tablet by mouth Every 8 (Eight) Hours As Needed for Mild Pain (1-3). 5/18/17   Cookie Palomino MD   loratadine (CLARITIN) 10 MG tablet Take 1 tablet by mouth Daily.  6/27/23  Zabrina Gonsalez MD   Norethin Ace-Eth Estrad-FE (JUNEL FE 1.5/30 PO) Take  by mouth Daily.  6/27/23  Zabrina Gonsalez MD   Pseudoephedrine-guaiFENesin -1200 MG tablet sustained-release 12 hour Take  by mouth Daily. Pt has been taking once a  "day for the past month since being sick  6/27/23  Provider, MD Zabrina       Allergies:  Ceclor [cefaclor], Tetanus toxoids, and Bactrim [sulfamethoxazole-trimethoprim]    ROS:    General: Weight stable  Resp: No SOA  Cardiovascular: No CP    Objective     Blood pressure 126/72, pulse 67, temperature 97.2 °F (36.2 °C), temperature source Temporal, resp. rate 20, height 160 cm (63\"), weight 101 kg (223 lb), SpO2 100 %, not currently breastfeeding.    Physical Exam   Constitutional: Pt is oriented to person, place, and in no distress.   Cardiovascular: Normal rate, regular rhythm.    Pulmonary/Chest: Effort normal. No respiratory distress.   Abdominal: Non-distended  Psychiatric: Mood, memory, affect and judgment appear normal.     Assessment & Plan     Diagnosis:  Dysphagia  Heartburn  Anticipated Surgical Procedure:  Endoscopy    The risks, benefits, and alternatives of this procedure have been discussed with the patient or the responsible party- the patient understands and agrees to proceed.      EMR Dragon/transcription disclaimer:  Much of this encounter note is electronic transcription/translation of spoken language to printed text.  The electronic translation of spoken language may be erroneous, or at times, nonsensical words or phrases may be inadvertently transcribed.  Although I have reviewed the note for such errors, some may still exist.  "

## 2023-06-29 LAB
CYTO UR: NORMAL
LAB AP CASE REPORT: NORMAL
Lab: NORMAL
PATH REPORT.FINAL DX SPEC: NORMAL
PATH REPORT.GROSS SPEC: NORMAL

## 2024-08-09 ENCOUNTER — TELEPHONE (OUTPATIENT)
Dept: GASTROENTEROLOGY | Facility: CLINIC | Age: 39
End: 2024-08-09
Payer: COMMERCIAL

## 2024-08-09 NOTE — TELEPHONE ENCOUNTER
PATIENT IS DUE FOR A REPEAT ENDOSCOPY. LEFT VM TO HAVE PT CALL TO SCHEDULE AN OV TO DISCUSS THIS.        LETTER SENT TO PCP.

## (undated) DEVICE — CONMED SCOPE SAVER BITE BLOCK, 20X27 MM: Brand: SCOPE SAVER

## (undated) DEVICE — CUFF,BP,DISP,1 TUBE,ADULT,HP: Brand: MEDLINE

## (undated) DEVICE — FRCP BIOP ENDO CAPTURA/PRO SERR SPK 2.8MM 230CM

## (undated) DEVICE — ARGYLE YANKAUER BULB TIP WITH VENT: Brand: ARGYLE

## (undated) DEVICE — THE CHANNEL CLEANING BRUSH IS A NYLON FLEXI BRUSH ATTACHED TO A FLEXIBLE PLASTIC SHEATH DESIGNED TO SAFELY REMOVE DEBRIS FROM FLEXIBLE ENDOSCOPES.

## (undated) DEVICE — SENSR O2 OXIMAX FNGR A/ 18IN NONSTR

## (undated) DEVICE — Device: Brand: DEFENDO AIR/WATER/SUCTION AND BIOPSY VALVE